# Patient Record
Sex: FEMALE | Race: WHITE | Employment: OTHER | URBAN - METROPOLITAN AREA
[De-identification: names, ages, dates, MRNs, and addresses within clinical notes are randomized per-mention and may not be internally consistent; named-entity substitution may affect disease eponyms.]

---

## 2017-03-27 ENCOUNTER — TELEPHONE (OUTPATIENT)
Dept: GASTROENTEROLOGY | Age: 72
End: 2017-03-27

## 2017-06-27 ENCOUNTER — OFFICE VISIT (OUTPATIENT)
Dept: SURGERY | Age: 72
End: 2017-06-27

## 2017-06-27 VITALS
WEIGHT: 127 LBS | HEIGHT: 57 IN | RESPIRATION RATE: 16 BRPM | DIASTOLIC BLOOD PRESSURE: 78 MMHG | HEART RATE: 80 BPM | SYSTOLIC BLOOD PRESSURE: 110 MMHG | BODY MASS INDEX: 27.4 KG/M2

## 2017-06-27 DIAGNOSIS — D05.12 DCIS (DUCTAL CARCINOMA IN SITU), LEFT: Primary | ICD-10-CM

## 2017-06-27 PROCEDURE — G8419 CALC BMI OUT NRM PARAM NOF/U: HCPCS | Performed by: SURGERY

## 2017-06-27 PROCEDURE — 1036F TOBACCO NON-USER: CPT | Performed by: SURGERY

## 2017-06-27 PROCEDURE — 1090F PRES/ABSN URINE INCON ASSESS: CPT | Performed by: SURGERY

## 2017-06-27 PROCEDURE — G8399 PT W/DXA RESULTS DOCUMENT: HCPCS | Performed by: SURGERY

## 2017-06-27 PROCEDURE — 3017F COLORECTAL CA SCREEN DOC REV: CPT | Performed by: SURGERY

## 2017-06-27 PROCEDURE — 4040F PNEUMOC VAC/ADMIN/RCVD: CPT | Performed by: SURGERY

## 2017-06-27 PROCEDURE — 3014F SCREEN MAMMO DOC REV: CPT | Performed by: SURGERY

## 2017-06-27 PROCEDURE — 1123F ACP DISCUSS/DSCN MKR DOCD: CPT | Performed by: SURGERY

## 2017-06-27 PROCEDURE — 99213 OFFICE O/P EST LOW 20 MIN: CPT | Performed by: SURGERY

## 2017-06-27 PROCEDURE — G8427 DOCREV CUR MEDS BY ELIG CLIN: HCPCS | Performed by: SURGERY

## 2017-06-27 RX ORDER — ANASTROZOLE 1 MG/1
TABLET ORAL
Qty: 30 TABLET | Refills: 11 | Status: SHIPPED | OUTPATIENT
Start: 2017-06-27 | End: 2017-10-11 | Stop reason: SDUPTHER

## 2017-07-13 ENCOUNTER — TELEPHONE (OUTPATIENT)
Dept: SURGERY | Age: 72
End: 2017-07-13

## 2017-07-13 DIAGNOSIS — Z12.31 SCREENING MAMMOGRAM, ENCOUNTER FOR: Primary | ICD-10-CM

## 2017-10-03 ENCOUNTER — HOSPITAL ENCOUNTER (OUTPATIENT)
Dept: WOMENS IMAGING | Age: 72
Discharge: HOME | End: 2017-10-03
Attending: SURGERY | Admitting: SURGERY

## 2017-10-03 DIAGNOSIS — Z12.31 SCREENING MAMMOGRAM, ENCOUNTER FOR: ICD-10-CM

## 2017-10-11 ENCOUNTER — TELEPHONE (OUTPATIENT)
Dept: SURGERY | Age: 72
End: 2017-10-11

## 2017-10-11 RX ORDER — ANASTROZOLE 1 MG/1
TABLET ORAL
Qty: 30 TABLET | Refills: 11 | Status: SHIPPED | OUTPATIENT
Start: 2017-10-11 | End: 2018-07-17 | Stop reason: SDUPTHER

## 2017-10-24 ENCOUNTER — HOSPITAL ENCOUNTER (OUTPATIENT)
Dept: CT IMAGING | Age: 72
Discharge: HOME | End: 2017-10-24
Attending: RADIOLOGY | Admitting: RADIOLOGY

## 2017-10-24 DIAGNOSIS — R59.0 LOCALIZED ENLARGED LYMPH NODES: ICD-10-CM

## 2017-10-24 DIAGNOSIS — R59.0 BILATERAL HILAR ADENOPATHY SYNDROME: ICD-10-CM

## 2017-10-24 LAB
GFR AFRICAN AMERICAN: 54 ML/MIN/1.73M2
GFR NON-AFRICAN AMERICAN: 45 ML/MIN/1.73M2
POC CREATININE: 1.2 MG/DL (ref 0.6–1.1)

## 2017-10-24 RX ORDER — SODIUM CHLORIDE 0.9 % (FLUSH) 0.9 %
10 SYRINGE (ML) INJECTION ONCE
Status: COMPLETED | OUTPATIENT
Start: 2017-10-24 | End: 2017-10-24

## 2017-10-24 RX ADMIN — Medication 10 ML: at 13:11

## 2018-01-11 ENCOUNTER — OFFICE VISIT (OUTPATIENT)
Dept: SURGERY | Age: 73
End: 2018-01-11

## 2018-01-11 VITALS
WEIGHT: 125 LBS | OXYGEN SATURATION: 96 % | SYSTOLIC BLOOD PRESSURE: 138 MMHG | DIASTOLIC BLOOD PRESSURE: 84 MMHG | HEART RATE: 84 BPM | BODY MASS INDEX: 27.05 KG/M2

## 2018-01-11 DIAGNOSIS — D05.12 DUCTAL CARCINOMA IN SITU (DCIS) OF LEFT BREAST: Primary | ICD-10-CM

## 2018-01-11 PROCEDURE — 3014F SCREEN MAMMO DOC REV: CPT | Performed by: SURGERY

## 2018-01-11 PROCEDURE — 1036F TOBACCO NON-USER: CPT | Performed by: SURGERY

## 2018-01-11 PROCEDURE — G8484 FLU IMMUNIZE NO ADMIN: HCPCS | Performed by: SURGERY

## 2018-01-11 PROCEDURE — G8419 CALC BMI OUT NRM PARAM NOF/U: HCPCS | Performed by: SURGERY

## 2018-01-11 PROCEDURE — 4040F PNEUMOC VAC/ADMIN/RCVD: CPT | Performed by: SURGERY

## 2018-01-11 PROCEDURE — G8399 PT W/DXA RESULTS DOCUMENT: HCPCS | Performed by: SURGERY

## 2018-01-11 PROCEDURE — 1090F PRES/ABSN URINE INCON ASSESS: CPT | Performed by: SURGERY

## 2018-01-11 PROCEDURE — G8427 DOCREV CUR MEDS BY ELIG CLIN: HCPCS | Performed by: SURGERY

## 2018-01-11 PROCEDURE — 1123F ACP DISCUSS/DSCN MKR DOCD: CPT | Performed by: SURGERY

## 2018-01-11 PROCEDURE — 99213 OFFICE O/P EST LOW 20 MIN: CPT | Performed by: SURGERY

## 2018-01-11 PROCEDURE — 3017F COLORECTAL CA SCREEN DOC REV: CPT | Performed by: SURGERY

## 2018-01-11 NOTE — PROGRESS NOTES
Harman Nicole is here for cancer of the left  Breast.  Initial size 2 cm. Nodes resected 0. Numbers positive: 0. Stage: TIS N0 M0. (Stage: IS)   Procedure: lumpectomy  Date: 10/13/2014   Hormonal Therapy:yes  Chemotherapy: no  Radiation Therapy: yes  BRAC no  ONCOTYPE no  ER+/[place]+/HER2+    Current Evaluation:   Today the patient is basically feeling well. She denies new breast mass, nipple discharge, or breast pain. No new subcutaneous mass, headache, bone pain, new cough, or abdominal pain. Physical Exam:   Generally healthy appearing,  female   Skin: no new subcutaneous mass   Breast: non-tender, no new mass   Lungs clear   Heart RRR  Lymphadenopathy: no new axillary and supraclavicular   Abdomen: non-tender without liver, spleen, kidney, or mass palpable     Mammography: 10/2017  Last mammogram:    Personally reviewed by me. Mammogram is unchanged from previous mammograms.  Radiologist report is negative     Impression:   No evidence of systemic or recurrent breast cancer:   No evidence of second primary breast cancer     Plan: RTC 6 mo  Will need a mammogram in Oct 2018

## 2018-07-17 ENCOUNTER — TELEPHONE (OUTPATIENT)
Dept: SURGERY | Age: 73
End: 2018-07-17

## 2018-07-17 ENCOUNTER — OFFICE VISIT (OUTPATIENT)
Dept: SURGERY | Age: 73
End: 2018-07-17

## 2018-07-17 VITALS
RESPIRATION RATE: 12 BRPM | WEIGHT: 116.6 LBS | HEART RATE: 79 BPM | DIASTOLIC BLOOD PRESSURE: 60 MMHG | SYSTOLIC BLOOD PRESSURE: 129 MMHG | BODY MASS INDEX: 25.23 KG/M2

## 2018-07-17 DIAGNOSIS — D05.12 INTRADUCTAL CARCINOMA IN SITU OF LEFT BREAST: Primary | ICD-10-CM

## 2018-07-17 DIAGNOSIS — D05.12 DUCTAL CARCINOMA IN SITU (DCIS) OF LEFT BREAST: Primary | ICD-10-CM

## 2018-07-17 PROCEDURE — 1036F TOBACCO NON-USER: CPT | Performed by: SURGERY

## 2018-07-17 PROCEDURE — G8399 PT W/DXA RESULTS DOCUMENT: HCPCS | Performed by: SURGERY

## 2018-07-17 PROCEDURE — G8419 CALC BMI OUT NRM PARAM NOF/U: HCPCS | Performed by: SURGERY

## 2018-07-17 PROCEDURE — 99213 OFFICE O/P EST LOW 20 MIN: CPT | Performed by: SURGERY

## 2018-07-17 PROCEDURE — 1090F PRES/ABSN URINE INCON ASSESS: CPT | Performed by: SURGERY

## 2018-07-17 PROCEDURE — 4040F PNEUMOC VAC/ADMIN/RCVD: CPT | Performed by: SURGERY

## 2018-07-17 PROCEDURE — 3017F COLORECTAL CA SCREEN DOC REV: CPT | Performed by: SURGERY

## 2018-07-17 PROCEDURE — 1101F PT FALLS ASSESS-DOCD LE1/YR: CPT | Performed by: SURGERY

## 2018-07-17 PROCEDURE — G8427 DOCREV CUR MEDS BY ELIG CLIN: HCPCS | Performed by: SURGERY

## 2018-07-17 PROCEDURE — 1123F ACP DISCUSS/DSCN MKR DOCD: CPT | Performed by: SURGERY

## 2018-07-17 RX ORDER — ANASTROZOLE 1 MG/1
TABLET ORAL
Qty: 30 TABLET | Refills: 11 | Status: SHIPPED | OUTPATIENT
Start: 2018-07-17 | End: 2018-11-20 | Stop reason: SDUPTHER

## 2018-07-17 NOTE — PROGRESS NOTES
Patricia Rodriguez has a pain level on 0/10 scale  0    Location breast    Description no pain    Radiation   No    Duration  6 month(s)    Time  none

## 2018-08-13 ENCOUNTER — TELEPHONE (OUTPATIENT)
Dept: SURGERY | Age: 73
End: 2018-08-13

## 2018-08-13 ENCOUNTER — HOSPITAL ENCOUNTER (OUTPATIENT)
Dept: CT IMAGING | Age: 73
Discharge: HOME | End: 2018-08-13
Attending: SURGERY | Admitting: SURGERY

## 2018-08-13 DIAGNOSIS — D05.12 BILATERAL DUCTAL CARCINOMA IN SITU OF BREASTS: ICD-10-CM

## 2018-08-13 DIAGNOSIS — D05.11 BILATERAL DUCTAL CARCINOMA IN SITU OF BREASTS: ICD-10-CM

## 2018-08-13 LAB
GFR AFRICAN AMERICAN: 47 ML/MIN/1.73M2
GFR NON-AFRICAN AMERICAN: 39 ML/MIN/1.73M2
POC CREATININE: 1.4 MG/DL (ref 0.6–1.1)

## 2018-08-20 ENCOUNTER — TELEPHONE (OUTPATIENT)
Dept: SURGERY | Age: 73
End: 2018-08-20

## 2018-09-07 ENCOUNTER — TELEPHONE (OUTPATIENT)
Dept: SURGERY | Age: 73
End: 2018-09-07

## 2018-09-07 DIAGNOSIS — Z17.0 MALIGNANT NEOPLASM OF LOWER-OUTER QUADRANT OF LEFT BREAST OF FEMALE, ESTROGEN RECEPTOR POSITIVE (HCC): ICD-10-CM

## 2018-09-07 DIAGNOSIS — C50.512 MALIGNANT NEOPLASM OF LOWER-OUTER QUADRANT OF LEFT BREAST OF FEMALE, ESTROGEN RECEPTOR POSITIVE (HCC): ICD-10-CM

## 2018-09-07 DIAGNOSIS — Z12.39 BREAST CANCER SCREENING: Primary | ICD-10-CM

## 2018-09-11 ENCOUNTER — TELEPHONE (OUTPATIENT)
Dept: SURGERY | Age: 73
End: 2018-09-11

## 2018-09-13 ENCOUNTER — TELEPHONE (OUTPATIENT)
Dept: SURGERY | Age: 73
End: 2018-09-13

## 2018-09-13 DIAGNOSIS — Z78.0 POST-MENOPAUSAL: Primary | ICD-10-CM

## 2018-10-08 ENCOUNTER — HOSPITAL ENCOUNTER (OUTPATIENT)
Dept: WOMENS IMAGING | Age: 73
Discharge: HOME | End: 2018-10-08
Payer: MEDICARE

## 2018-10-08 ENCOUNTER — HOSPITAL ENCOUNTER (OUTPATIENT)
Dept: ULTRASOUND IMAGING | Age: 73
End: 2018-10-08
Payer: MEDICARE

## 2018-10-08 DIAGNOSIS — Z17.0 MALIGNANT NEOPLASM OF LOWER-OUTER QUADRANT OF LEFT BREAST OF FEMALE, ESTROGEN RECEPTOR POSITIVE (HCC): ICD-10-CM

## 2018-10-08 DIAGNOSIS — C50.512 MALIGNANT NEOPLASM OF LOWER-OUTER QUADRANT OF LEFT BREAST OF FEMALE, ESTROGEN RECEPTOR POSITIVE (HCC): ICD-10-CM

## 2018-10-08 DIAGNOSIS — Z12.39 BREAST CANCER SCREENING: ICD-10-CM

## 2018-10-08 DIAGNOSIS — Z78.0 POST-MENOPAUSAL: ICD-10-CM

## 2018-10-08 PROCEDURE — G0279 TOMOSYNTHESIS, MAMMO: HCPCS

## 2018-10-08 PROCEDURE — 77080 DXA BONE DENSITY AXIAL: CPT

## 2018-11-20 ENCOUNTER — OFFICE VISIT (OUTPATIENT)
Dept: SURGERY | Age: 73
End: 2018-11-20
Payer: MEDICARE

## 2018-11-20 VITALS
WEIGHT: 119.4 LBS | HEART RATE: 70 BPM | BODY MASS INDEX: 25.84 KG/M2 | RESPIRATION RATE: 16 BRPM | DIASTOLIC BLOOD PRESSURE: 58 MMHG | SYSTOLIC BLOOD PRESSURE: 131 MMHG

## 2018-11-20 DIAGNOSIS — D05.12 DUCTAL CARCINOMA IN SITU (DCIS) OF LEFT BREAST: Primary | ICD-10-CM

## 2018-11-20 DIAGNOSIS — Z17.0 MALIGNANT NEOPLASM OF LOWER-OUTER QUADRANT OF LEFT BREAST OF FEMALE, ESTROGEN RECEPTOR POSITIVE (HCC): ICD-10-CM

## 2018-11-20 DIAGNOSIS — C50.512 MALIGNANT NEOPLASM OF LOWER-OUTER QUADRANT OF LEFT BREAST OF FEMALE, ESTROGEN RECEPTOR POSITIVE (HCC): ICD-10-CM

## 2018-11-20 PROCEDURE — 4040F PNEUMOC VAC/ADMIN/RCVD: CPT | Performed by: SURGERY

## 2018-11-20 PROCEDURE — 1101F PT FALLS ASSESS-DOCD LE1/YR: CPT | Performed by: SURGERY

## 2018-11-20 PROCEDURE — G8419 CALC BMI OUT NRM PARAM NOF/U: HCPCS | Performed by: SURGERY

## 2018-11-20 PROCEDURE — 99213 OFFICE O/P EST LOW 20 MIN: CPT | Performed by: SURGERY

## 2018-11-20 PROCEDURE — 1123F ACP DISCUSS/DSCN MKR DOCD: CPT | Performed by: SURGERY

## 2018-11-20 PROCEDURE — 1090F PRES/ABSN URINE INCON ASSESS: CPT | Performed by: SURGERY

## 2018-11-20 PROCEDURE — G8484 FLU IMMUNIZE NO ADMIN: HCPCS | Performed by: SURGERY

## 2018-11-20 PROCEDURE — G8427 DOCREV CUR MEDS BY ELIG CLIN: HCPCS | Performed by: SURGERY

## 2018-11-20 PROCEDURE — 1036F TOBACCO NON-USER: CPT | Performed by: SURGERY

## 2018-11-20 PROCEDURE — 3017F COLORECTAL CA SCREEN DOC REV: CPT | Performed by: SURGERY

## 2018-11-20 PROCEDURE — G8399 PT W/DXA RESULTS DOCUMENT: HCPCS | Performed by: SURGERY

## 2018-11-20 RX ORDER — ANASTROZOLE 1 MG/1
TABLET ORAL
Qty: 90 TABLET | Refills: 3 | Status: SHIPPED | OUTPATIENT
Start: 2018-11-20 | End: 2019-02-14 | Stop reason: SDUPTHER

## 2018-12-14 ENCOUNTER — TELEPHONE (OUTPATIENT)
Dept: SURGERY | Age: 73
End: 2018-12-14

## 2018-12-14 ENCOUNTER — PROCEDURE VISIT (OUTPATIENT)
Dept: SURGERY | Age: 73
End: 2018-12-14
Payer: MEDICARE

## 2018-12-14 ENCOUNTER — HOSPITAL ENCOUNTER (OUTPATIENT)
Age: 73
Discharge: HOME | End: 2018-12-14
Payer: MEDICARE

## 2018-12-14 ENCOUNTER — HOSPITAL ENCOUNTER (OUTPATIENT)
Age: 73
Setting detail: SPECIMEN
Discharge: HOME | End: 2018-12-14
Payer: MEDICARE

## 2018-12-14 ENCOUNTER — HOSPITAL ENCOUNTER (OUTPATIENT)
Dept: GENERAL RADIOLOGY | Age: 73
Discharge: HOME | End: 2018-12-14
Payer: MEDICARE

## 2018-12-14 VITALS
HEART RATE: 68 BPM | DIASTOLIC BLOOD PRESSURE: 69 MMHG | SYSTOLIC BLOOD PRESSURE: 152 MMHG | RESPIRATION RATE: 14 BRPM | BODY MASS INDEX: 25.97 KG/M2 | WEIGHT: 120 LBS

## 2018-12-14 DIAGNOSIS — L98.9 SKIN LESION: Primary | ICD-10-CM

## 2018-12-14 DIAGNOSIS — M89.9 BONE LESION: ICD-10-CM

## 2018-12-14 DIAGNOSIS — M89.9 BONE LESION: Primary | ICD-10-CM

## 2018-12-14 PROCEDURE — 88305 TISSUE EXAM BY PATHOLOGIST: CPT

## 2018-12-14 PROCEDURE — 11442 EXC FACE-MM B9+MARG 1.1-2 CM: CPT | Performed by: SURGERY

## 2018-12-14 PROCEDURE — 70355 PANORAMIC X-RAY OF JAWS: CPT

## 2018-12-14 NOTE — PROGRESS NOTES
Minor Surgery Procedure Note    MD Aida Win        [id number]       12/14/2018. Pre op DX:Non healing lesion right chin    Post OP Dx:Same    Procedure:Excision 1.5 cm lesion right chin    PROCEDURE DETAILS:Pt was brought to the room and the lesion confirmed. It was prepped and anesthetized. It was excised in an ellipse down through the subqu to the periosteum of the bone. The defect was closed with 3-0 vicryl and running 5-0 prolene. Sterile dressing was applied. Anesthesia: 3 cc's of1% xylocaine plain    Postop the patient did well and can go home. They are to remove the dressings in 24 hours and shower. Ernesto Arteaga.

## 2018-12-20 ENCOUNTER — OFFICE VISIT (OUTPATIENT)
Dept: SURGERY | Age: 73
End: 2018-12-20

## 2018-12-20 VITALS — DIASTOLIC BLOOD PRESSURE: 80 MMHG | SYSTOLIC BLOOD PRESSURE: 128 MMHG | OXYGEN SATURATION: 99 % | HEART RATE: 89 BPM

## 2018-12-20 DIAGNOSIS — Z09 POSTOP CHECK: Primary | ICD-10-CM

## 2018-12-20 PROCEDURE — 99024 POSTOP FOLLOW-UP VISIT: CPT | Performed by: NURSE PRACTITIONER

## 2018-12-27 ENCOUNTER — TELEPHONE (OUTPATIENT)
Dept: SURGERY | Age: 73
End: 2018-12-27

## 2019-02-14 RX ORDER — ANASTROZOLE 1 MG/1
TABLET ORAL
Qty: 90 TABLET | Refills: 3 | Status: SHIPPED | OUTPATIENT
Start: 2019-02-14 | End: 2020-02-17 | Stop reason: SDUPTHER

## 2019-05-21 ENCOUNTER — OFFICE VISIT (OUTPATIENT)
Dept: SURGERY | Age: 74
End: 2019-05-21
Payer: MEDICARE

## 2019-05-21 VITALS
DIASTOLIC BLOOD PRESSURE: 68 MMHG | BODY MASS INDEX: 26.1 KG/M2 | RESPIRATION RATE: 20 BRPM | WEIGHT: 120.6 LBS | SYSTOLIC BLOOD PRESSURE: 140 MMHG | HEART RATE: 74 BPM

## 2019-05-21 DIAGNOSIS — D05.12 DUCTAL CARCINOMA IN SITU (DCIS) OF LEFT BREAST: Primary | ICD-10-CM

## 2019-05-21 PROCEDURE — 99213 OFFICE O/P EST LOW 20 MIN: CPT | Performed by: SURGERY

## 2019-05-21 NOTE — PROGRESS NOTES
Joann Vidal has a pain level on 0/10 scale  0    Location left breast    Description no pain    Radiation   No    Duration  6 month(s)    Time  none

## 2019-05-21 NOTE — PROGRESS NOTES
Taylor Arm is here for cancer of the left  Breast.  Initial size 2 cm. Nodes resected 0. Numbers positive: 0. Stage: TIS N0 M0. (Stage: IS)   Procedure: lumpectomy  Date: 10/13/2014   Hormonal Therapy:yes, still taking  Chemotherapy: no  Radiation Therapy: done  [facility] no  ONCOTYPE no  ER+/[place]+/HER2+    Current Evaluation:   Today the patient is basically feeling well. She denies new breast mass, nipple discharge, or breast pain. No new subcutaneous mass, headache, bone pain, new cough, or abdominal pain. Physical Exam:   Generally healthy appearing,  female   Skin: no new subcutaneous mass   Breast: non-tender, no new mass   Lungs clear   Heart RRR  Lymphadenopathy: no new axillary and supraclavicular   Abdomen: non-tender without liver, spleen, kidney, or mass palpable     Mammography:   Last mammogram: 10/2018   Personally reviewed by me. Mammogram is unchanged from previous mammograms.  Radiologist report is negative     Impression:   No evidence of systemic or recurrent breast cancer:   No evidence of second primary breast cancer     Plan: RTC 6 mo  Will need a mammogram in Oct 2019

## 2019-10-09 ENCOUNTER — HOSPITAL ENCOUNTER (OUTPATIENT)
Dept: WOMENS IMAGING | Age: 74
Discharge: HOME | End: 2019-10-09
Payer: MEDICARE

## 2019-10-09 DIAGNOSIS — Z12.31 VISIT FOR SCREENING MAMMOGRAM: ICD-10-CM

## 2019-10-09 PROCEDURE — 77063 BREAST TOMOSYNTHESIS BI: CPT

## 2019-10-25 ENCOUNTER — OFFICE VISIT (OUTPATIENT)
Dept: FAMILY MEDICINE CLINIC | Age: 74
End: 2019-10-25
Payer: MEDICARE

## 2019-10-25 VITALS
SYSTOLIC BLOOD PRESSURE: 140 MMHG | BODY MASS INDEX: 23.26 KG/M2 | TEMPERATURE: 97 F | DIASTOLIC BLOOD PRESSURE: 74 MMHG | HEIGHT: 59 IN | OXYGEN SATURATION: 95 % | WEIGHT: 115.4 LBS | HEART RATE: 74 BPM

## 2019-10-25 DIAGNOSIS — Z76.89 ESTABLISHING CARE WITH NEW DOCTOR, ENCOUNTER FOR: ICD-10-CM

## 2019-10-25 DIAGNOSIS — I10 ESSENTIAL HYPERTENSION: ICD-10-CM

## 2019-10-25 DIAGNOSIS — Z85.850 HISTORY OF THYROID CANCER: ICD-10-CM

## 2019-10-25 DIAGNOSIS — Z91.81 AT HIGH RISK FOR FALLS: ICD-10-CM

## 2019-10-25 DIAGNOSIS — F32.5 MAJOR DEPRESSIVE DISORDER WITH SINGLE EPISODE, IN FULL REMISSION (HCC): ICD-10-CM

## 2019-10-25 DIAGNOSIS — E03.9 ACQUIRED HYPOTHYROIDISM: ICD-10-CM

## 2019-10-25 DIAGNOSIS — Z85.3 HISTORY OF BREAST CANCER: ICD-10-CM

## 2019-10-25 DIAGNOSIS — K51.90 ULCERATIVE COLITIS WITHOUT COMPLICATIONS, UNSPECIFIED LOCATION (HCC): ICD-10-CM

## 2019-10-25 DIAGNOSIS — M81.0 OSTEOPOROSIS WITHOUT CURRENT PATHOLOGICAL FRACTURE, UNSPECIFIED OSTEOPOROSIS TYPE: ICD-10-CM

## 2019-10-25 DIAGNOSIS — J69.0 ASPIRATION PNEUMONIA, UNSPECIFIED ASPIRATION PNEUMONIA TYPE, UNSPECIFIED LATERALITY, UNSPECIFIED PART OF LUNG (HCC): Primary | ICD-10-CM

## 2019-10-25 PROCEDURE — 99204 OFFICE O/P NEW MOD 45 MIN: CPT | Performed by: FAMILY MEDICINE

## 2019-10-25 RX ORDER — METOPROLOL SUCCINATE 25 MG/1
25 TABLET, EXTENDED RELEASE ORAL DAILY
Qty: 90 TABLET | Refills: 0 | Status: SHIPPED | OUTPATIENT
Start: 2019-10-25 | End: 2019-12-10

## 2019-10-25 RX ORDER — ALENDRONATE SODIUM 70 MG/1
1 TABLET ORAL
COMMUNITY
End: 2019-10-25 | Stop reason: SDUPTHER

## 2019-10-25 RX ORDER — POTASSIUM CHLORIDE 20 MEQ/1
20 TABLET, EXTENDED RELEASE ORAL DAILY
Qty: 90 TABLET | Refills: 0 | Status: SHIPPED | OUTPATIENT
Start: 2019-10-25 | End: 2020-01-06 | Stop reason: SDUPTHER

## 2019-10-25 RX ORDER — ALENDRONATE SODIUM 70 MG/1
70 TABLET ORAL
Qty: 12 TABLET | Refills: 3 | Status: SHIPPED | OUTPATIENT
Start: 2019-10-25 | End: 2020-01-06 | Stop reason: SDUPTHER

## 2019-10-25 RX ORDER — AMLODIPINE BESYLATE 10 MG/1
10 TABLET ORAL DAILY
Qty: 90 TABLET | Refills: 0 | Status: SHIPPED | OUTPATIENT
Start: 2019-10-25 | End: 2020-01-06 | Stop reason: SDUPTHER

## 2019-10-25 RX ORDER — VENLAFAXINE HYDROCHLORIDE 37.5 MG/1
37.5 TABLET, EXTENDED RELEASE ORAL
Qty: 90 TABLET | Refills: 0 | Status: SHIPPED | OUTPATIENT
Start: 2019-10-25 | End: 2020-01-06 | Stop reason: CLARIF

## 2019-10-25 RX ORDER — SPIRONOLACTONE 25 MG/1
25 TABLET ORAL DAILY
Qty: 90 TABLET | Refills: 0 | Status: SHIPPED | OUTPATIENT
Start: 2019-10-25 | End: 2020-01-06 | Stop reason: SDUPTHER

## 2019-10-25 RX ORDER — ALBUTEROL SULFATE 90 UG/1
2 AEROSOL, METERED RESPIRATORY (INHALATION)
COMMUNITY
Start: 2019-09-30 | End: 2022-02-21

## 2019-10-25 RX ORDER — POTASSIUM CHLORIDE 20 MEQ/1
TABLET, EXTENDED RELEASE ORAL
COMMUNITY
End: 2019-10-25 | Stop reason: SDUPTHER

## 2019-10-25 ASSESSMENT — PATIENT HEALTH QUESTIONNAIRE - PHQ9
1. LITTLE INTEREST OR PLEASURE IN DOING THINGS: 1
2. FEELING DOWN, DEPRESSED OR HOPELESS: 1
SUM OF ALL RESPONSES TO PHQ QUESTIONS 1-9: 2
SUM OF ALL RESPONSES TO PHQ9 QUESTIONS 1 & 2: 2
SUM OF ALL RESPONSES TO PHQ QUESTIONS 1-9: 2

## 2019-10-25 ASSESSMENT — ENCOUNTER SYMPTOMS
SHORTNESS OF BREATH: 1
NAUSEA: 0
VOICE CHANGE: 0
DIARRHEA: 0
VOMITING: 0
BLOOD IN STOOL: 1
COUGH: 1
ABDOMINAL PAIN: 0

## 2019-11-19 ENCOUNTER — TELEPHONE (OUTPATIENT)
Dept: FAMILY MEDICINE CLINIC | Age: 74
End: 2019-11-19

## 2019-11-26 ENCOUNTER — OFFICE VISIT (OUTPATIENT)
Dept: SURGERY | Age: 74
End: 2019-11-26
Payer: MEDICARE

## 2019-11-26 VITALS
BODY MASS INDEX: 22.62 KG/M2 | DIASTOLIC BLOOD PRESSURE: 79 MMHG | SYSTOLIC BLOOD PRESSURE: 144 MMHG | WEIGHT: 112 LBS | RESPIRATION RATE: 18 BRPM | HEART RATE: 90 BPM

## 2019-11-26 DIAGNOSIS — D05.12 DUCTAL CARCINOMA IN SITU (DCIS) OF LEFT BREAST: Primary | ICD-10-CM

## 2019-11-26 PROCEDURE — 99213 OFFICE O/P EST LOW 20 MIN: CPT | Performed by: SURGERY

## 2019-12-03 ENCOUNTER — HOSPITAL ENCOUNTER (OUTPATIENT)
Dept: RADIATION ONCOLOGY | Age: 74
Discharge: HOME | End: 2019-12-03
Payer: MEDICARE

## 2019-12-03 VITALS
HEART RATE: 91 BPM | WEIGHT: 112 LBS | DIASTOLIC BLOOD PRESSURE: 77 MMHG | HEIGHT: 60 IN | OXYGEN SATURATION: 98 % | TEMPERATURE: 98.1 F | SYSTOLIC BLOOD PRESSURE: 147 MMHG | RESPIRATION RATE: 16 BRPM | BODY MASS INDEX: 21.99 KG/M2

## 2019-12-03 ASSESSMENT — PAIN SCALES - GENERAL: PAINLEVEL_OUTOF10: 0

## 2019-12-06 RX ORDER — METOPROLOL SUCCINATE 25 MG/1
25 TABLET, EXTENDED RELEASE ORAL DAILY
Qty: 90 TABLET | Refills: 0 | OUTPATIENT
Start: 2019-12-06

## 2019-12-10 RX ORDER — METOPROLOL SUCCINATE 100 MG/1
100 TABLET, EXTENDED RELEASE ORAL DAILY
Qty: 30 TABLET | Refills: 0
Start: 2019-12-10 | End: 2019-12-30 | Stop reason: SDUPTHER

## 2019-12-26 RX ORDER — METOPROLOL SUCCINATE 100 MG/1
100 TABLET, EXTENDED RELEASE ORAL DAILY
Qty: 30 TABLET | Refills: 0 | OUTPATIENT
Start: 2019-12-26

## 2019-12-30 RX ORDER — METOPROLOL SUCCINATE 100 MG/1
100 TABLET, EXTENDED RELEASE ORAL DAILY
Qty: 30 TABLET | Refills: 0 | Status: SHIPPED | OUTPATIENT
Start: 2019-12-30 | End: 2020-01-06 | Stop reason: SDUPTHER

## 2020-01-06 ENCOUNTER — OFFICE VISIT (OUTPATIENT)
Dept: FAMILY MEDICINE CLINIC | Age: 75
End: 2020-01-06
Payer: MEDICARE

## 2020-01-06 VITALS
DIASTOLIC BLOOD PRESSURE: 80 MMHG | WEIGHT: 112.4 LBS | SYSTOLIC BLOOD PRESSURE: 134 MMHG | TEMPERATURE: 97.1 F | HEART RATE: 81 BPM | BODY MASS INDEX: 21.95 KG/M2 | OXYGEN SATURATION: 98 %

## 2020-01-06 PROBLEM — E89.0 POSTOPERATIVE HYPOTHYROIDISM: Status: ACTIVE | Noted: 2019-10-25

## 2020-01-06 PROCEDURE — 99214 OFFICE O/P EST MOD 30 MIN: CPT | Performed by: FAMILY MEDICINE

## 2020-01-06 RX ORDER — POTASSIUM CHLORIDE 20 MEQ/1
20 TABLET, EXTENDED RELEASE ORAL DAILY
Qty: 90 TABLET | Refills: 1 | Status: SHIPPED | OUTPATIENT
Start: 2020-01-06 | End: 2020-08-24 | Stop reason: SDUPTHER

## 2020-01-06 RX ORDER — VENLAFAXINE HYDROCHLORIDE 37.5 MG/1
37.5 CAPSULE, EXTENDED RELEASE ORAL DAILY
Qty: 90 CAPSULE | Refills: 1 | Status: SHIPPED | OUTPATIENT
Start: 2020-01-06 | End: 2020-08-24 | Stop reason: SDUPTHER

## 2020-01-06 RX ORDER — METOPROLOL SUCCINATE 100 MG/1
100 TABLET, EXTENDED RELEASE ORAL DAILY
Qty: 90 TABLET | Refills: 1 | Status: SHIPPED | OUTPATIENT
Start: 2020-01-06 | End: 2020-08-24 | Stop reason: SDUPTHER

## 2020-01-06 RX ORDER — ALENDRONATE SODIUM 70 MG/1
70 TABLET ORAL
Qty: 12 TABLET | Refills: 3 | Status: SHIPPED | OUTPATIENT
Start: 2020-01-06 | End: 2021-04-01 | Stop reason: SDUPTHER

## 2020-01-06 RX ORDER — VENLAFAXINE HYDROCHLORIDE 37.5 MG/1
37.5 CAPSULE, EXTENDED RELEASE ORAL DAILY
COMMUNITY
End: 2020-01-06 | Stop reason: SDUPTHER

## 2020-01-06 RX ORDER — LEVOTHYROXINE SODIUM 88 UG/1
88 CAPSULE ORAL DAILY
Qty: 30 CAPSULE | Status: CANCELLED | OUTPATIENT
Start: 2020-01-06

## 2020-01-06 RX ORDER — ALENDRONATE SODIUM 70 MG/1
70 TABLET ORAL
Qty: 12 TABLET | Refills: 3 | Status: CANCELLED | OUTPATIENT
Start: 2020-01-06

## 2020-01-06 RX ORDER — SPIRONOLACTONE 25 MG/1
25 TABLET ORAL DAILY
Qty: 90 TABLET | Refills: 1 | Status: SHIPPED | OUTPATIENT
Start: 2020-01-06 | End: 2020-08-24 | Stop reason: SDUPTHER

## 2020-01-06 RX ORDER — ANASTROZOLE 1 MG/1
TABLET ORAL
Qty: 90 TABLET | Refills: 3 | Status: CANCELLED | OUTPATIENT
Start: 2020-01-06

## 2020-01-06 RX ORDER — AMLODIPINE BESYLATE 10 MG/1
10 TABLET ORAL DAILY
Qty: 90 TABLET | Refills: 1 | Status: SHIPPED | OUTPATIENT
Start: 2020-01-06 | End: 2020-08-24 | Stop reason: SDUPTHER

## 2020-01-06 ASSESSMENT — ENCOUNTER SYMPTOMS: SHORTNESS OF BREATH: 0

## 2020-01-06 NOTE — PROGRESS NOTES
of children: Not on file    Years of education: Not on file    Highest education level: Not on file   Occupational History    Not on file   Social Needs    Financial resource strain: Not on file    Food insecurity:     Worry: Not on file     Inability: Not on file    Transportation needs:     Medical: Not on file     Non-medical: Not on file   Tobacco Use    Smoking status: Never Smoker    Smokeless tobacco: Never Used   Substance and Sexual Activity    Alcohol use: Yes     Comment: average 3-4 times per year    Drug use: No    Sexual activity: Not on file   Lifestyle    Physical activity:     Days per week: Not on file     Minutes per session: Not on file    Stress: Not on file   Relationships    Social connections:     Talks on phone: Not on file     Gets together: Not on file     Attends Congregation service: Not on file     Active member of club or organization: Not on file     Attends meetings of clubs or organizations: Not on file     Relationship status: Not on file    Intimate partner violence:     Fear of current or ex partner: Not on file     Emotionally abused: Not on file     Physically abused: Not on file     Forced sexual activity: Not on file   Other Topics Concern    Not on file   Social History Narrative    Not on file     Family History   Problem Relation Age of Onset    High Blood Pressure Mother     Cancer Father         liver cancer          Objective:   Physical Exam  Vitals signs and nursing note reviewed. Constitutional:       General: She is not in acute distress. Appearance: Normal appearance. She is not ill-appearing. HENT:      Head: Normocephalic and atraumatic. Right Ear: External ear normal.      Left Ear: External ear normal.      Mouth/Throat:      Mouth: Mucous membranes are moist.   Neck:      Musculoskeletal: Neck supple. No muscular tenderness. Cardiovascular:      Rate and Rhythm: Normal rate and regular rhythm. Heart sounds: No murmur. tablet, Take 1 tablet by mouth daily, Disp: 90 tablet, Rfl: 1    spironolactone (ALDACTONE) 25 MG tablet, Take 1 tablet by mouth daily, Disp: 90 tablet, Rfl: 1    venlafaxine (EFFEXOR XR) 37.5 MG extended release capsule, Take 1 capsule by mouth daily, Disp: 90 capsule, Rfl: 1    alendronate (FOSAMAX) 70 MG tablet, Take 1 tablet by mouth every 7 days, Disp: 12 tablet, Rfl: 3    anastrozole (ARIMIDEX) 1 MG tablet, TAKE ONE TABLET BY MOUTH ONCE DAILY, Disp: 90 tablet, Rfl: 3    Levothyroxine Sodium 88 MCG CAPS, Take 88 mcg by mouth Daily , Disp: , Rfl:     calcium-vitamin D (OSCAL-500) 500-200 MG-UNIT per tablet, Take 1 tablet by mouth daily. , Disp: , Rfl:     albuterol sulfate HFA (PROVENTIL HFA) 108 (90 Base) MCG/ACT inhaler, Inhale 2 puffs into the lungs, Disp: , Rfl:          Jm Urbina MD

## 2020-02-17 RX ORDER — ANASTROZOLE 1 MG/1
TABLET ORAL
Qty: 90 TABLET | Refills: 3 | Status: SHIPPED | OUTPATIENT
Start: 2020-02-17 | End: 2021-04-13 | Stop reason: SDUPTHER

## 2020-03-09 ENCOUNTER — TELEPHONE (OUTPATIENT)
Dept: FAMILY MEDICINE CLINIC | Age: 75
End: 2020-03-09

## 2020-03-23 ENCOUNTER — TELEPHONE (OUTPATIENT)
Dept: FAMILY MEDICINE CLINIC | Age: 75
End: 2020-03-23

## 2020-03-23 NOTE — TELEPHONE ENCOUNTER
I recommend fluids, rest, Motrin/tylenol for fever/myalgia, OTC cough medication. I can send in a prescription cough medication if needed.

## 2020-03-25 ENCOUNTER — TELEPHONE (OUTPATIENT)
Dept: FAMILY MEDICINE CLINIC | Age: 75
End: 2020-03-25

## 2020-03-27 NOTE — TELEPHONE ENCOUNTER
Patient daughter called back state mother is getting worse and asked for a squad. Advised her to call the paramedics and explain her symptoms and possible concern for COVID over the phone so they are aware.  Daughter agreed and expressed thanks

## 2020-03-27 NOTE — TELEPHONE ENCOUNTER
Patient daughter calling back with concerns for COVID-19. State she is unsure if she has a fever but seems to have taken a turn for the worse. She cries all the time and is very emotional. Still having productive cough, chest congestion, SOB, fatigue and weakness. State she is not eating at all and daughter is very scared for patient, feels lost and unsure what to do.  Discussed having her admitted to the hospital but she is concerned about being to close to her and would like your recommendation on what to do

## 2020-04-16 ENCOUNTER — TELEPHONE (OUTPATIENT)
Dept: FAMILY MEDICINE CLINIC | Age: 75
End: 2020-04-16

## 2020-04-17 NOTE — TELEPHONE ENCOUNTER
Is she drinking honey thickened liquids? What inhalers/meds is she taking for her breathing? Has she talked/seen her pulmonologist recently?

## 2020-04-25 LAB
BUN BLDV-MCNC: 26 MG/DL
CALCIUM SERPL-MCNC: 10.9 MG/DL
CHLORIDE BLD-SCNC: 98 MMOL/L
CO2: 27 MMOL/L
CREAT SERPL-MCNC: 1.7 MG/DL
GFR CALCULATED: 29
GLUCOSE BLD-MCNC: 117 MG/DL
POTASSIUM SERPL-SCNC: 4.7 MMOL/L
SODIUM BLD-SCNC: 141 MMOL/L
T4 FREE: 1.42
TSH SERPL DL<=0.05 MIU/L-ACNC: 2.9 UIU/ML

## 2020-05-21 ENCOUNTER — TELEPHONE (OUTPATIENT)
Dept: FAMILY MEDICINE CLINIC | Age: 75
End: 2020-05-21

## 2020-05-21 NOTE — TELEPHONE ENCOUNTER
Patient called state we should receive a form from [employer] for her ostomy supplies. Wants to inform you state you have never filled form out before but this is the company she uses for her supplies. Patient given fax number for company to send over form.

## 2020-07-15 ENCOUNTER — TELEPHONE (OUTPATIENT)
Dept: FAMILY MEDICINE CLINIC | Age: 75
End: 2020-07-15

## 2020-07-15 NOTE — TELEPHONE ENCOUNTER
Patient is on puree diet and thick liquids. Patient ROM and Oral has improved and patient is wanting to go on thinner liquids. They need the test to determine if patient is ready to move to the thinner liquids the tech that will be doing the test will be working with patient and showing some techniques to help patient.

## 2020-07-15 NOTE — TELEPHONE ENCOUNTER
Radha Speech therapists  from [facility] called wanting to get a swallow study done at [facility].  The test would be a Swallow study with speech therapy present.           Radha can be reached at [phone]

## 2020-07-16 NOTE — TELEPHONE ENCOUNTER
[facility] calling back the order needs to be for video fluoroscopy.  Order can be faxed to [phone] [facility]

## 2020-08-24 ENCOUNTER — OFFICE VISIT (OUTPATIENT)
Dept: FAMILY MEDICINE CLINIC | Age: 75
End: 2020-08-24
Payer: MEDICARE

## 2020-08-24 VITALS
BODY MASS INDEX: 21.95 KG/M2 | OXYGEN SATURATION: 96 % | DIASTOLIC BLOOD PRESSURE: 80 MMHG | HEART RATE: 103 BPM | WEIGHT: 112.4 LBS | TEMPERATURE: 98.4 F | SYSTOLIC BLOOD PRESSURE: 144 MMHG

## 2020-08-24 PROBLEM — D25.9 UTERINE LEIOMYOMA: Status: ACTIVE | Noted: 2020-08-24

## 2020-08-24 PROCEDURE — 99215 OFFICE O/P EST HI 40 MIN: CPT | Performed by: FAMILY MEDICINE

## 2020-08-24 RX ORDER — SPIRONOLACTONE 25 MG/1
25 TABLET ORAL DAILY
Qty: 90 TABLET | Refills: 1 | Status: SHIPPED | OUTPATIENT
Start: 2020-08-24 | End: 2021-02-25 | Stop reason: SDUPTHER

## 2020-08-24 RX ORDER — ANASTROZOLE 1 MG/1
TABLET ORAL
Qty: 90 TABLET | Refills: 3 | Status: CANCELLED | OUTPATIENT
Start: 2020-08-24

## 2020-08-24 RX ORDER — POTASSIUM CHLORIDE 20 MEQ/1
20 TABLET, EXTENDED RELEASE ORAL DAILY
Qty: 90 TABLET | Refills: 1 | Status: SHIPPED | OUTPATIENT
Start: 2020-08-24 | End: 2021-05-11 | Stop reason: SDUPTHER

## 2020-08-24 RX ORDER — METOPROLOL SUCCINATE 100 MG/1
100 TABLET, EXTENDED RELEASE ORAL DAILY
Qty: 90 TABLET | Refills: 1 | Status: SHIPPED | OUTPATIENT
Start: 2020-08-24 | End: 2021-02-25 | Stop reason: SDUPTHER

## 2020-08-24 RX ORDER — AMLODIPINE BESYLATE 10 MG/1
10 TABLET ORAL DAILY
Qty: 90 TABLET | Refills: 1 | Status: SHIPPED | OUTPATIENT
Start: 2020-08-24 | End: 2021-02-25 | Stop reason: SDUPTHER

## 2020-08-24 RX ORDER — VENLAFAXINE HYDROCHLORIDE 37.5 MG/1
37.5 CAPSULE, EXTENDED RELEASE ORAL DAILY
Qty: 90 CAPSULE | Refills: 1 | Status: SHIPPED | OUTPATIENT
Start: 2020-08-24 | End: 2021-02-25 | Stop reason: SDUPTHER

## 2020-08-24 ASSESSMENT — ENCOUNTER SYMPTOMS: SHORTNESS OF BREATH: 0

## 2020-08-24 NOTE — PROGRESS NOTES
Subjective:      Patient ID: Adelaide Wylie is a 70 y.o. female. Karo Lozano is here to follow up on her chronic conditions. Hypertension   This is a chronic problem. The problem is controlled. Pertinent negatives include no chest pain, peripheral edema or shortness of breath. There are no known risk factors for coronary artery disease. Past treatments include beta blockers, calcium channel blockers and diuretics. The current treatment provides significant improvement. There are no compliance problems. There is no history of CAD/MI. Hypothyroid  Has been on current dose of  levothyroxine (Synthroid) for a couple years. Pt reports symptoms have been stable. Reports residual symptoms to include none. Pt denies heat / cold intolerance. History of high blood pressure? yes  History of diabetes? no    Lab Results   Component Value Date    TSH 2.900 04/24/2020     Lab Results   Component Value Date    TSH 2.900 04/24/2020       No components found for: CHLPL  No results found for: TRIG  No results found for: HDL  No results found for: LDLCALC  No results found for: LABVLDL  Dysphagia  Newly diagnosed. She is thickening her foods. She is crushing her meds to toake in applesauce. She has a question as to if she can crush fosamax. No further pneumonia since changing diet. Osteoporosis  Chronic. Meds: fosamax  Compliance takes weekly. Side effects: none. Last DEXA: 10/2018 - osteoporosis. T range -1.8 to -2.5  D&A  Chronic. Meds: Effexor  Compliance: takes daily. Side effects: none  Efficacy: works great. Doing well/   Cancer  Concerns about bladder cancer. Has received info from insurance about claims for bladder cancer. She has known thyroid and breast cancer, as well as a BCC of the skin. She reports seeing a urologist this past March in regards to a bladder tumor. She had an ultrasound but has not heard anything else. CT Scan 2/2020  Result Impression   1.  Mass in the pelvis of unclear origin. Right lower quadrant ostomy. Streak artifact secondary to right hip arthroplasty limits evaluation. There is no evidence of small or large bowel obstruction  2.  Large hiatal hernia  3.  Nodules in the lung bases worrisome for metastases  4.  Bilateral renal cortical cysts  5.  Small hepatic cyst           This dictation was created with voice recognition software.  While attempts have been made to review the dictation as it is transcribed, on occasion the spoken word can be misinterpreted by the technology leading to omissions or inappropriate words, phrases or sentences. Electronically Signed by: Petty Zayas, 2/20/2020 1:21 PM   Result Narrative   PROCEDURE: CT-ABD/PELVIS W IV AND ORAL CONTRAST     DATE OF EXAM:  2/20/2020 12:47 PM    DEMOGRAPHICS: 70years old Female     INDICATION:     Bowel obstruction high-grade suspected          Contrast utilized and the relevant clinical information: History:  abd pain. Number of Series/Images:  4. 75 ISOVUE 370 76 % injection. COMPARISON: 10/15/2019    TECHNIQUE: Contiguous axial slices of the abdomen and pelvis were submitted after the IV administration of contrast. No oral contrast was utilized. Additional coronal reformatted images were submitted.      DOSE OPTIMIZATION: CT radiation dose optimization techniques (automated exposure control, and use of iterative reconstruction techniques, or adjustment of the mA and/or kV according to patient size) were used to limit patient radiation dose. FINDINGS:  CT ABDOMEN:    Inferior chest:  Dependent changes in the lower lobes. Multiple nodules in both lung bases again seen. Dependent changes are seen in the right lower lobe. Heart size is normal. Trace pericardial effusion. No pleural effusion. Gallbladder:  Mildly distended. Dilated common duct measuring 1 cm.     Liver:  Normal enhancement with a fluid attenuation mass in the right lobe measuring 1.2 cm    Spleen: Normal enhancement    Pancreas: Normal findings and post therapeutic changes as described above.               Electronically Signed by: Ameena Ojeda, 2/28/2020 12:06 PM   Result Narrative   PET-CT TUMOR IMAGING SKULL BASE TO THIGH, PET-BRAIN POST WB (COUNT/NR)    CLINICAL INDICATION: The patient is a 62-year-old female with multiple malignancies. She has had bilateral breast cancer initially diagnosed on the left in 2002, for which she received lumpectomy at the time as well as hormonal blockage. In 2014, she received contralateral right lumpectomy and adjuvant radiation, as well as well as hormone blockage of focal or overall overall all. She has an additional history of thyroid carcinoma treated with resection and radioiodine treatment in 2005, and of basal cell carcinoma, which was treated surgically. She now presents for a 7 cm pelvic mass posterior to the bladder and chronic bilateral lung nodules. Surveillance PET/CT is requested. No prior PET/CT is available for comparison. TECHNIQUE: The patient was studied in a fasting state and 8.7 mCi of fluorine 18 labeled deoxyglucose was given intravenously. About 60 minutes later tomographic imaging of the brain, neck, chest, abdomen and pelvis was obtained. The patient's blood sugar was 145 mg/dl at the time of injection. A low dose CT scan was acquired in order to provide attenuation correction and basic spatial localization for the PET images. The CT scan portion of the study is not for diagnostic purposes. No contrast agent was given and no breath hold technique was performed. A separate referral should be made if a diagnostic CT is required. FINDINGS: Physiologic radiotracer uptake is seen throughout the brain. Within the right thyroid bed, there is focally elevated radiotracer uptake within an ill-defined 2.4 x 2.0 cm soft tissue density mass with SUV Max of 4.7, which is highly concerning for locally recurrent thyroid cancer.  Correlation with thyroglobulin values and/or Thyrogen enhanced whole body imaging could be considered for further evaluation. Otherwise physiologic radiotracer uptake is seen throughout the head and neck structures. There is evidence of prior right lumpectomy with metallic clips in the inferior and upper outer portion of the breast. Within the posterior aspect of the right middle lobe, 2 adjacent pulmonary nodules show elevated radiotracer uptake, measuring 0.5 cm with SUV Max of 1.7, and 0.9 cm with SUV Max of 1.2. In the medial aspect of the right middle lobe, a 0.6 cm pulmonary nodule shows elevated radiotracer uptake with SUV Max of 2.5. In the medial base of the right middle lobe, a 0.6 cm pulmonary opacity shows SUV Max of 1.0. A 0.3 cm pulmonary opacity in the major fissure of the right lung shows SUV Max of 0.8. In the central left lower lobe, a 0.4 cm pulmonary opacity shows SUV Max of 0.9. In the posteromedial left lower lobe, a subpleural 0.5 cm pulmonary opacity shows SUV Max of 0.7. While not all of the nodules meet SUV values that are definitely suggestive for malignancy, the overall pattern is compatible with metastatic disease. Metallic clip is apparent in the left hilum. Additional metallic clip is apparent in the left lower lobe lung base. There is otherwise physiologic radiotracer uptake throughout mediastinal, pulmonary, axillary, and breast structures. There is physiologic radiotracer uptake throughout the abdomen and pelvis. Right lower quadrant enterostomy is apparent. There is suture in the anterior left paramedian upper body wall. The patient has had right hip arthroplasty. There are degenerative changes, e.g. involving the spine. There is focally prominent radiotracer uptake within the left hemipelvis posteriorly and inferiorly adjacent to the left hip joint with SUV Max of 2.7.  This level of radiotracer uptake could be seen in physiologic bone marrow, however given slight focality of the finding, additional imaging, e.g. with MRI, could be considered as appropriate to rule out underlying bone marrow malignancy with certainty. The osseous and soft tissue structures show otherwise physiologic uptake of the radiotracer     U/S pelvis 3/20  Result Addenda   Addendum by Arvind Lora MD on 03/05/2020 12:16 PM  ** ADDENDUM: #1 **  Additional comparison is made to a CT abdomen from 2/20/2020. The   hypoechoic structure identified in the pelvis on this examination likely   corresponds to the suspected fibroid identified in the uterus on the prior   CT. Electronically Signed by: Nidia Hays MD, 3/5/2020 12:16 PM   Result Impression   1.  Suboptimal evaluation of the pelvis with transabdominal approach. There is a soft tissue structure identified posterior to the urinary bladder that may represent the uterus but normal uterine anatomy is not well demonstrated on this exam.           This dictation was created with voice recognition software.  While attempts have been made to review the dictation as it is transcribed, on occasion the spoken word can be misinterpreted by the technology leading to omissions or inappropriate words, phrases or sentences. Electronically Signed by: Nidia Hays MD, 3/2/2020 3:31 PM   Result Narrative     ** ORIGINAL REPORT **  EXAMINATION: US-PELVIS TRANSABDOMINAL 36081     DATE OF EXAM:  3/2/2020 11:54 AM    DEMOGRAPHICS: 70years old Female     INDICATION:     R19.00: Intra-abdominal and pelvic swelling, mass and lump, unspecified site     Reason for Exam:  Intra-abdominal and pelvic swelling, mass and lump, unspecified site. COMPARISON: No existing relevant imaging study corresponding to the same anatomical region is available. TECHNIQUE: Transabdominal sonographic evaluation of the pelvis was performed. FINDINGS:  UTERUS: Evaluation is limited by transabdominal approach. There is a soft tissue echogenicity structure identified in the area posterior to the urinary bladder. This measures 62 x 59 x 59 mm.  This may represent a uterus but the endometrium and normal uterine contour is not well demonstrated. RIGHT OVARY: Not visualized    LEFT OVARY: Not visualized    OTHER: There is no free fluid in the pelvis. Review of Systems   Respiratory: Negative for shortness of breath. Cardiovascular: Negative for chest pain. 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 Jeannett Primrose [facility]    Hypertension     Ileostomy care [facility])     Nausea & vomiting     \"got sick with one surgery\"    Thyroid ca [facility])     had thyroid cancer in 2005- tx with radioactive iodine and radiation    Thyroid disease     hypo    Ulcerative colitis ([facility]      Past Surgical History:   Procedure Laterality Date    APPENDECTOMY      BREAST LUMPECTOMY Left 10/13/14    left lumpectomy with needle localization    BREAST SURGERY      2001 lumpectomy right breast    ILEOSTOMY OR JEJUNOSTOMY Right 1966    colon surgery\"removed most of the large and part of small intestine\"'removed appendix at that time    JOINT REPLACEMENT Right     hip   [address]    \"removed a tumor from the left lung\"    RHINOPLASTY      TONSILLECTOMY  as a child     Social History     Socioeconomic History    Marital status:      Spouse name: Not on file    Number of children: Not on file    Years of education: Not on file    Highest education level: Not on file   Occupational History    Not on file   Social Needs    Financial resource strain: Not on file    Food insecurity     Worry: Not on file     Inability: Not on file    Transportation needs     Medical: Not on file     Non-medical: Not on file   Tobacco Use    Smoking status: Never Smoker    Smokeless tobacco: Never Used   Substance and Sexual Activity    Alcohol use:  Yes Comment: average 3-4 times per year    Drug use: No    Sexual activity: Not on file   Lifestyle    Physical activity     Days per week: Not on file     Minutes per session: Not on file    Stress: Not on file   Relationships    Social connections     Talks on phone: Not on file     Gets together: Not on file     Attends Confucianist service: Not on file     Active member of club or organization: Not on file     Attends meetings of clubs or organizations: Not on file     Relationship status: Not on file    Intimate partner violence     Fear of current or ex partner: Not on file     Emotionally abused: Not on file     Physically abused: Not on file     Forced sexual activity: Not on file   Other Topics Concern    Not on file   Social History Narrative    Not on file     Family History   Problem Relation Age of Onset    High Blood Pressure Mother     Cancer Father         liver cancer        Objective:   Physical Exam  Vitals signs and nursing note reviewed. Constitutional:       Appearance: Normal appearance. She is not ill-appearing. Neck:      Musculoskeletal: Neck supple. No muscular tenderness. Cardiovascular:      Rate and Rhythm: Normal rate and regular rhythm. Heart sounds: No murmur. Pulmonary:      Effort: Pulmonary effort is normal.      Breath sounds: Normal breath sounds. No wheezing, rhonchi or rales. Musculoskeletal:      Right lower leg: No edema. Left lower leg: No edema. Lymphadenopathy:      Cervical: No cervical adenopathy. Neurological:      General: No focal deficit present. Mental Status: She is alert and oriented to person, place, and time. Psychiatric:         Mood and Affect: Mood normal.         Behavior: Behavior normal.         Assessment:       Diagnosis Orders   1. Essential hypertension  spironolactone (ALDACTONE) 25 MG tablet    metoprolol succinate (TOPROL XL) 100 MG extended release tablet    amLODIPine (NORVASC) 10 MG tablet   2.  Dysphagia, pharyngeal phase     3. Hypokalemia  spironolactone (ALDACTONE) 25 MG tablet    potassium chloride (KLOR-CON M) 20 MEQ extended release tablet   4. Postoperative hypothyroidism     5. History of thyroid cancer     6. Osteoporosis without current pathological fracture, unspecified osteoporosis type     7. Major depressive disorder with single episode, in full remission (HCC)  venlafaxine (EFFEXOR XR) 37.5 MG extended release capsule   8. Uterine leiomyoma, unspecified location     9. History of breast cancer            Plan:      1. Stable and controlled given her age and health status. Labs normal in April, continue care. 2. Continue crushing meds and thickening liquids. Talk with prhamacist about ability/safety to crush fosamax. 3. Continue potassium supplementation. Monitor every 6 months. 4. Stable on current care. Continue care with endocrine. Continue levothyroxine. 5. Care per endocrine and Onc. Increased uptake on PET scan in Feb, biopsy was negative. Continue care. 6. Continue fosamax weekly. If crushing pill isn't an option, she will need to discuss other options with her endocrine, Onc and PCP. 7. Controlled on effexor, continue. 8. This is the mass Id'd on CT and pelvic ultrasound according to the radiologist. Observe for change in size with yearly imaging. No evidence of bladder cancer was identified on any imaging or PET scan. 9. Continue arimidex. Care per onc. Appears stable. Follow up 6 months: chronic conditions.        Current Outpatient Medications:     venlafaxine (EFFEXOR XR) 37.5 MG extended release capsule, Take 1 capsule by mouth daily, Disp: 90 capsule, Rfl: 1    spironolactone (ALDACTONE) 25 MG tablet, Take 1 tablet by mouth daily, Disp: 90 tablet, Rfl: 1    metoprolol succinate (TOPROL XL) 100 MG extended release tablet, Take 1 tablet by mouth daily, Disp: 90 tablet, Rfl: 1    amLODIPine (NORVASC) 10 MG tablet, Take 1 tablet by mouth daily, Disp: 90

## 2020-08-25 RX ORDER — ALENDRONATE SODIUM 70 MG/1
70 TABLET ORAL
Qty: 12 TABLET | Refills: 3 | OUTPATIENT
Start: 2020-08-25

## 2020-08-25 NOTE — TELEPHONE ENCOUNTER
Requested Prescriptions     Pending Prescriptions Disp Refills    alendronate (FOSAMAX) 70 MG tablet 12 tablet 3     Sig: Take 1 tablet by mouth every 7 days

## 2020-09-02 ENCOUNTER — TELEPHONE (OUTPATIENT)
Dept: FAMILY MEDICINE CLINIC | Age: 75
End: 2020-09-02

## 2020-09-02 NOTE — TELEPHONE ENCOUNTER
Emmanuelle Lucero would like to know if you can sign for it. Emmanuelle Lucero can be reached at [phone].

## 2020-09-02 NOTE — TELEPHONE ENCOUNTER
[address] [facility] asking if you can give vo to continue speech therapy and skilled nursing, or else they will have to discharge patient.

## 2020-10-08 ENCOUNTER — TELEPHONE (OUTPATIENT)
Dept: SURGERY | Age: 75
End: 2020-10-08

## 2020-10-28 ENCOUNTER — HOSPITAL ENCOUNTER (OUTPATIENT)
Dept: WOMENS IMAGING | Age: 75
Discharge: HOME | End: 2020-10-28
Payer: MEDICARE

## 2020-10-28 PROCEDURE — 77063 BREAST TOMOSYNTHESIS BI: CPT

## 2020-10-28 PROCEDURE — 77080 DXA BONE DENSITY AXIAL: CPT

## 2020-12-01 ENCOUNTER — OFFICE VISIT (OUTPATIENT)
Dept: SURGERY | Age: 75
End: 2020-12-01
Payer: MEDICARE

## 2020-12-01 ENCOUNTER — HOSPITAL ENCOUNTER (OUTPATIENT)
Age: 75
Discharge: HOME | End: 2020-12-01
Payer: MEDICARE

## 2020-12-01 ENCOUNTER — HOSPITAL ENCOUNTER (OUTPATIENT)
Dept: GENERAL RADIOLOGY | Age: 75
Discharge: HOME | End: 2020-12-01
Payer: MEDICARE

## 2020-12-01 VITALS
OXYGEN SATURATION: 100 % | BODY MASS INDEX: 21.94 KG/M2 | SYSTOLIC BLOOD PRESSURE: 151 MMHG | WEIGHT: 116.2 LBS | HEIGHT: 61 IN | HEART RATE: 91 BPM | DIASTOLIC BLOOD PRESSURE: 82 MMHG | TEMPERATURE: 98.1 F

## 2020-12-01 PROCEDURE — 99213 OFFICE O/P EST LOW 20 MIN: CPT | Performed by: SURGERY

## 2020-12-01 PROCEDURE — 73552 X-RAY EXAM OF FEMUR 2/>: CPT

## 2021-02-25 ENCOUNTER — OFFICE VISIT (OUTPATIENT)
Dept: FAMILY MEDICINE CLINIC | Age: 76
End: 2021-02-25
Payer: MEDICARE

## 2021-02-25 VITALS
BODY MASS INDEX: 21.16 KG/M2 | WEIGHT: 112 LBS | SYSTOLIC BLOOD PRESSURE: 150 MMHG | OXYGEN SATURATION: 96 % | DIASTOLIC BLOOD PRESSURE: 85 MMHG | HEART RATE: 112 BPM

## 2021-02-25 DIAGNOSIS — R73.9 HYPERGLYCEMIA: ICD-10-CM

## 2021-02-25 DIAGNOSIS — I10 ESSENTIAL HYPERTENSION: Primary | ICD-10-CM

## 2021-02-25 DIAGNOSIS — F32.5 MAJOR DEPRESSIVE DISORDER WITH SINGLE EPISODE, IN FULL REMISSION (HCC): ICD-10-CM

## 2021-02-25 DIAGNOSIS — E03.9 ACQUIRED HYPOTHYROIDISM: ICD-10-CM

## 2021-02-25 DIAGNOSIS — E87.6 HYPOKALEMIA: ICD-10-CM

## 2021-02-25 DIAGNOSIS — R30.0 DYSURIA: ICD-10-CM

## 2021-02-25 DIAGNOSIS — Z90.49 H/O COLECTOMY: ICD-10-CM

## 2021-02-25 DIAGNOSIS — Z13.220 LIPID SCREENING: ICD-10-CM

## 2021-02-25 DIAGNOSIS — R06.02 SOB (SHORTNESS OF BREATH): ICD-10-CM

## 2021-02-25 LAB
BILIRUBIN, POC: NEGATIVE
BLOOD URINE, POC: NEGATIVE
CLARITY, POC: CLEAR
COLOR, POC: YELLOW
GLUCOSE URINE, POC: NEGATIVE
KETONES, POC: NEGATIVE
LEUKOCYTE EST, POC: NEGATIVE
NITRITE, POC: NEGATIVE
PH, POC: 5
PROTEIN, POC: NORMAL
SPECIFIC GRAVITY, POC: 1.02
UROBILINOGEN, POC: NORMAL

## 2021-02-25 PROCEDURE — 81002 URINALYSIS NONAUTO W/O SCOPE: CPT | Performed by: FAMILY MEDICINE

## 2021-02-25 PROCEDURE — 99214 OFFICE O/P EST MOD 30 MIN: CPT | Performed by: FAMILY MEDICINE

## 2021-02-25 RX ORDER — AMLODIPINE BESYLATE 10 MG/1
10 TABLET ORAL DAILY
Qty: 90 TABLET | Refills: 3 | Status: SHIPPED | OUTPATIENT
Start: 2021-02-25 | End: 2022-02-09 | Stop reason: SDUPTHER

## 2021-02-25 RX ORDER — METOPROLOL SUCCINATE 100 MG/1
100 TABLET, EXTENDED RELEASE ORAL DAILY
Qty: 90 TABLET | Refills: 3 | Status: SHIPPED | OUTPATIENT
Start: 2021-02-25 | End: 2022-02-09 | Stop reason: SDUPTHER

## 2021-02-25 RX ORDER — VENLAFAXINE HYDROCHLORIDE 37.5 MG/1
37.5 CAPSULE, EXTENDED RELEASE ORAL DAILY
Qty: 90 CAPSULE | Refills: 3 | Status: SHIPPED | OUTPATIENT
Start: 2021-02-25 | End: 2022-02-09 | Stop reason: SDUPTHER

## 2021-02-25 RX ORDER — SPIRONOLACTONE 25 MG/1
25 TABLET ORAL DAILY
Qty: 90 TABLET | Refills: 3 | Status: SHIPPED | OUTPATIENT
Start: 2021-02-25 | End: 2022-02-09 | Stop reason: SDUPTHER

## 2021-02-25 NOTE — PROGRESS NOTES
Emani Land  1945 02/28/21    Chief Complaint   Patient presents with   Milad Fish Doctor     Patient here to establish care with PCP    Other     Patient states not feeling well. PAtient denies fever but having chills           78 yrs old lady with PMH HTN, hypothyroidism, s/p thyroid cancer, depression, h/o colostomy bag since 25 yrs old, because of ulcerative colitis, patient also c/o not feeling well, has some SOB, chills, no fever, no cough, no headache, stats has some lower abdominal pressure, was having UTI 2 wks ago and was treated. No N/V. 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 Leon Sifuentes [facility]    [facility]     [facility])     Nausea & vomiting     \"got sick with one surgery\"    Thyroid ca [facility])     had thyroid cancer in 2005- tx with radioactive iodine and radiation    Thyroid disease     hypo    Ulcerative colitis ([address]      Past Surgical History:   Procedure Laterality Date    APPENDECTOMY      BREAST LUMPECTOMY Left 10/13/14    left lumpectomy with needle localization    BREAST SURGERY      2001 lumpectomy right breast    ILEOSTOMY OR JEJUNOSTOMY Right 1966    colon surgery\"removed most of the large and part of small intestine\"'removed appendix at that time    JOINT REPLACEMENT Right     hip   [address]    \"removed a tumor from the left lung\"   [address]  as a child     Family History   Problem Relation Age of Onset    High Blood Pressure Mother     Cancer Father         liver cancer     Social History     Socioeconomic History    Marital status:       Spouse name: Not on file    Number of children: Not on file    Years of education: Not on file    Highest education level: Not on file Occupational History    Not on file   Social Needs    Financial resource strain: Not on file    Food insecurity     Worry: Not on file     Inability: Not on file    Transportation needs     Medical: Not on file     Non-medical: Not on file   Tobacco Use    Smoking status: Never Smoker    Smokeless tobacco: Never Used   Substance and Sexual Activity    Alcohol use: Not Currently     Comment: average 3-4 times per year    Drug use: No    Sexual activity: Not on file   Lifestyle    Physical activity     Days per week: Not on file     Minutes per session: Not on file    Stress: Not on file   Relationships    Social connections     Talks on phone: Not on file     Gets together: Not on file     Attends Restorationist service: Not on file     Active member of club or organization: Not on file     Attends meetings of clubs or organizations: Not on file     Relationship status: Not on file    Intimate partner violence     Fear of current or ex partner: Not on file     Emotionally abused: Not on file     Physically abused: Not on file     Forced sexual activity: Not on file   Other Topics Concern    Not on file   Social History Narrative    Not on file       Allergies   Allergen Reactions    Codeine Nausea And Vomiting    Biaxin [Clarithromycin] Nausea Only    Demerol Hcl [Meperidine]      \"room spins\"  \"room spins\"    Sulfa Antibiotics Rash     Current Outpatient Medications   Medication Sig Dispense Refill    spironolactone (ALDACTONE) 25 MG tablet Take 1 tablet by mouth daily 90 tablet 3    metoprolol succinate (TOPROL XL) 100 MG extended release tablet Take 1 tablet by mouth daily 90 tablet 3    amLODIPine (NORVASC) 10 MG tablet Take 1 tablet by mouth daily 90 tablet 3    venlafaxine (EFFEXOR XR) 37.5 MG extended release capsule Take 1 capsule by mouth daily 90 capsule 3    potassium chloride (KLOR-CON M) 20 MEQ extended release tablet Take 1 tablet by mouth daily 90 tablet 1    anastrozole (ARIMIDEX) 1 MG tablet TAKE ONE TABLET BY MOUTH ONCE DAILY 90 tablet 3    alendronate (FOSAMAX) 70 MG tablet Take 1 tablet by mouth every 7 days 12 tablet 3    Levothyroxine Sodium 88 MCG CAPS Take 88 mcg by mouth Daily       calcium-vitamin D (OSCAL-500) 500-200 MG-UNIT per tablet Take 1 tablet by mouth daily.  albuterol sulfate HFA (PROVENTIL HFA) 108 (90 Base) MCG/ACT inhaler Inhale 2 puffs into the lungs       No current facility-administered medications for this visit. Review of Systems   Constitutional: Positive for activity change and chills. Negative for appetite change, fatigue and fever. HENT: Negative for congestion, postnasal drip, sinus pressure and sore throat. Respiratory: Positive for shortness of breath. Negative for cough and chest tightness. Cardiovascular: Negative for chest pain and leg swelling. Gastrointestinal: Negative for abdominal pain, constipation and diarrhea. Genitourinary: Positive for dysuria. Negative for frequency. Musculoskeletal: Negative for back pain. Skin: Negative for rash. Neurological: Negative for dizziness and headaches. Psychiatric/Behavioral: Negative for agitation, behavioral problems and sleep disturbance. The patient is not nervous/anxious.         Lab Results   Component Value Date    WBC 7.0 10/19/2014    HGB 12.9 10/19/2014    HCT 37.8 10/19/2014    MCV 86.2 10/19/2014     10/19/2014     Lab Results   Component Value Date     04/24/2020    K 4.7 04/24/2020    CL 98 04/24/2020    CO2 27 04/24/2020    BUN 26 04/24/2020    CREATININE 1.7 04/24/2020    GLUCOSE 117 04/24/2020    CALCIUM 10.9 04/24/2020    LABGLOM 29 04/24/2020    GFRAA 47 (L) 08/13/2018     Lab Results   Component Value Date    TSH 2.900 04/24/2020         BP (!) 150/85 (Site: Left Upper Arm, Position: Sitting, Cuff Size: Medium Adult)   Pulse 112   Wt 112 lb (50.8 kg)   SpO2 96%   BMI 21.16 kg/m²     BP Readings from Last 3 Encounters:   02/25/21 (!) 150/85   12/01/20 (!) 151/82   08/24/20 (!) 144/80       Wt Readings from Last 3 Encounters:   02/25/21 112 lb (50.8 kg)   12/01/20 116 lb 3.2 oz (52.7 kg)   08/24/20 112 lb 6.4 oz (51 kg)         Physical Exam  Constitutional:       General: She is not in acute distress. Appearance: Normal appearance. She is well-developed. She is not ill-appearing or diaphoretic. HENT:      Head: Normocephalic and atraumatic. Neck:      Musculoskeletal: Normal range of motion and neck supple. No neck rigidity. Cardiovascular:      Rate and Rhythm: Normal rate and regular rhythm. Heart sounds: Normal heart sounds. No murmur. Pulmonary:      Effort: Pulmonary effort is normal.      Breath sounds: Normal breath sounds. No wheezing. Abdominal:      General: There is no distension. Palpations: Abdomen is soft. There is no mass. Tenderness: There is no abdominal tenderness. Comments: Colostomy bag in place   Musculoskeletal: Normal range of motion. Right lower leg: No edema. Left lower leg: No edema. Neurological:      General: No focal deficit present. Mental Status: She is alert and oriented to person, place, and time. Psychiatric:         Mood and Affect: Mood normal.         Behavior: Behavior normal.         ASSESSMENT/ PLAN:    1. Essential hypertension  - little elevated will recheck in one month  - spironolactone (ALDACTONE) 25 MG tablet; Take 1 tablet by mouth daily  Dispense: 90 tablet; Refill: 3  - metoprolol succinate (TOPROL XL) 100 MG extended release tablet; Take 1 tablet by mouth daily  Dispense: 90 tablet; Refill: 3  - amLODIPine (NORVASC) 10 MG tablet; Take 1 tablet by mouth daily  Dispense: 90 tablet; Refill: 3  - Comprehensive Metabolic Panel, Fasting; Future  - CBC Auto Differential; Future    2. Acquired hypothyroidism  - will check:  - TSH without Reflex; Future  - T4, Free; Future    3.  Major depressive disorder with single episode, in full remission ([facility]  - stable  - venlafaxine (EFFEXOR XR) 37.5 MG extended release capsule; Take 1 capsule by mouth daily  Dispense: 90 capsule; Refill: 3    4. H/O colectomy  - Vitamin B12 & Folate; Future    5. SOB (shortness of breath)  - XR CHEST STANDARD (2 VW); Future    6. Dysuria  - POCT Urinalysis no Micro, negative    7. Hypokalemia  - spironolactone (ALDACTONE) 25 MG tablet; Take 1 tablet by mouth daily  Dispense: 90 tablet; Refill: 3    8. Hyperglycemia  - Hemoglobin A1C; Future    9. Lipid screening  - Lipid Panel; Future              - All old blood work reviewed with the patient  - Appropriate prescription are addressed. - After visit summery provided. - Questions answered and patient verbalizes understanding.  - Call for any problem, questions, or concerns.  - RTC if symptoms worse. Return in about 1 month (around 3/25/2021).

## 2021-02-28 PROBLEM — Z90.49 H/O COLECTOMY: Status: ACTIVE | Noted: 2021-02-28

## 2021-02-28 ASSESSMENT — ENCOUNTER SYMPTOMS
BACK PAIN: 0
SHORTNESS OF BREATH: 1
CHEST TIGHTNESS: 0
COUGH: 0
SINUS PRESSURE: 0
CONSTIPATION: 0
ABDOMINAL PAIN: 0
DIARRHEA: 0
SORE THROAT: 0

## 2021-03-01 ENCOUNTER — HOSPITAL ENCOUNTER (OUTPATIENT)
Age: 76
Discharge: HOME | End: 2021-03-01
Payer: MEDICARE

## 2021-03-01 ENCOUNTER — HOSPITAL ENCOUNTER (OUTPATIENT)
Dept: GENERAL RADIOLOGY | Age: 76
Discharge: HOME | End: 2021-03-01
Payer: MEDICARE

## 2021-03-01 DIAGNOSIS — R06.02 SOB (SHORTNESS OF BREATH): ICD-10-CM

## 2021-03-01 LAB
ALBUMIN SERPL-MCNC: 4.7 GM/DL (ref 3.4–5)
ALP BLD-CCNC: 62 IU/L (ref 40–128)
ALT SERPL-CCNC: 24 U/L (ref 10–40)
ANION GAP SERPL CALCULATED.3IONS-SCNC: 16 MMOL/L (ref 4–16)
AST SERPL-CCNC: 35 IU/L (ref 15–37)
BILIRUB SERPL-MCNC: 0.4 MG/DL (ref 0–1)
BUN BLDV-MCNC: 24 MG/DL (ref 6–23)
CALCIUM SERPL-MCNC: 9.3 MG/DL (ref 8.3–10.6)
CHLORIDE BLD-SCNC: 104 MMOL/L (ref 99–110)
CHOLESTEROL: 256 MG/DL
CO2: 18 MMOL/L (ref 21–32)
CREAT SERPL-MCNC: 1.2 MG/DL (ref 0.6–1.1)
ESTIMATED AVERAGE GLUCOSE: 120 MG/DL
GFR AFRICAN AMERICAN: 53 ML/MIN/1.73M2
GFR NON-AFRICAN AMERICAN: 44 ML/MIN/1.73M2
GLUCOSE BLD-MCNC: 110 MG/DL (ref 70–99)
HBA1C MFR BLD: 5.8 % (ref 4.2–6.3)
HDLC SERPL-MCNC: 87 MG/DL
LDL CHOLESTEROL DIRECT: 160 MG/DL
POTASSIUM SERPL-SCNC: 5 MMOL/L (ref 3.5–5.1)
REASON FOR REJECTION: NORMAL
REJECTED TEST: NORMAL
SODIUM BLD-SCNC: 138 MMOL/L (ref 135–145)
T4 FREE: 2.18 NG/DL (ref 0.9–1.8)
TOTAL PROTEIN: 7.1 GM/DL (ref 6.4–8.2)
TRIGL SERPL-MCNC: 93 MG/DL
TSH HIGH SENSITIVITY: 0.04 UIU/ML (ref 0.27–4.2)

## 2021-03-01 PROCEDURE — 82607 VITAMIN B-12: CPT

## 2021-03-01 PROCEDURE — 83036 HEMOGLOBIN GLYCOSYLATED A1C: CPT

## 2021-03-01 PROCEDURE — 84439 ASSAY OF FREE THYROXINE: CPT

## 2021-03-01 PROCEDURE — 84443 ASSAY THYROID STIM HORMONE: CPT

## 2021-03-01 PROCEDURE — 71046 X-RAY EXAM CHEST 2 VIEWS: CPT

## 2021-03-01 PROCEDURE — 82746 ASSAY OF FOLIC ACID SERUM: CPT

## 2021-03-01 PROCEDURE — 83721 ASSAY OF BLOOD LIPOPROTEIN: CPT

## 2021-03-01 PROCEDURE — 80053 COMPREHEN METABOLIC PANEL: CPT

## 2021-03-01 PROCEDURE — 80061 LIPID PANEL: CPT

## 2021-03-01 PROCEDURE — 36415 COLL VENOUS BLD VENIPUNCTURE: CPT

## 2021-03-02 ENCOUNTER — HOSPITAL ENCOUNTER (OUTPATIENT)
Age: 76
Setting detail: SPECIMEN
Discharge: HOME | End: 2021-03-02
Payer: MEDICARE

## 2021-03-02 LAB
BASOPHILS ABSOLUTE: 0.1 K/CU MM
BASOPHILS RELATIVE PERCENT: 1.1 % (ref 0–1)
DIFFERENTIAL TYPE: ABNORMAL
EOSINOPHILS ABSOLUTE: 0.2 K/CU MM
EOSINOPHILS RELATIVE PERCENT: 3.4 % (ref 0–3)
FOLATE: 16.5 NG/ML (ref 3.1–17.5)
HCT VFR BLD CALC: 43.6 % (ref 37–47)
HEMOGLOBIN: 13 GM/DL (ref 12.5–16)
IMMATURE NEUTROPHIL %: 0.2 % (ref 0–0.43)
LYMPHOCYTES ABSOLUTE: 0.8 K/CU MM
LYMPHOCYTES RELATIVE PERCENT: 12.4 % (ref 24–44)
MCH RBC QN AUTO: 29.3 PG (ref 27–31)
MCHC RBC AUTO-ENTMCNC: 29.8 % (ref 32–36)
MCV RBC AUTO: 98.4 FL (ref 78–100)
MONOCYTES ABSOLUTE: 0.4 K/CU MM
MONOCYTES RELATIVE PERCENT: 6.3 % (ref 0–4)
NUCLEATED RBC %: 0 %
PDW BLD-RTO: 13.8 % (ref 11.7–14.9)
PLATELET # BLD: 272 K/CU MM (ref 140–440)
PMV BLD AUTO: 11.2 FL (ref 7.5–11.1)
RBC # BLD: 4.43 M/CU MM (ref 4.2–5.4)
SEGMENTED NEUTROPHILS ABSOLUTE COUNT: 4.8 K/CU MM
SEGMENTED NEUTROPHILS RELATIVE PERCENT: 76.6 % (ref 36–66)
TOTAL IMMATURE NEUTOROPHIL: 0.01 K/CU MM
TOTAL NUCLEATED RBC: 0 K/CU MM
VITAMIN B-12: 579.8 PG/ML (ref 211–911)
WBC # BLD: 6.2 K/CU MM (ref 4–10.5)

## 2021-03-02 PROCEDURE — 82746 ASSAY OF FOLIC ACID SERUM: CPT

## 2021-03-02 PROCEDURE — 82607 VITAMIN B-12: CPT

## 2021-03-02 PROCEDURE — 85025 COMPLETE CBC W/AUTO DIFF WBC: CPT

## 2021-03-02 PROCEDURE — 36415 COLL VENOUS BLD VENIPUNCTURE: CPT

## 2021-03-08 ENCOUNTER — TELEPHONE (OUTPATIENT)
Dept: FAMILY MEDICINE CLINIC | Age: 76
End: 2021-03-08

## 2021-03-10 NOTE — TELEPHONE ENCOUNTER
Patient informed and confirmed understanding.  Patient currently ay [facility] pt coughing up blood

## 2021-04-01 ENCOUNTER — OFFICE VISIT (OUTPATIENT)
Dept: FAMILY MEDICINE CLINIC | Age: 76
End: 2021-04-01
Payer: MEDICARE

## 2021-04-01 VITALS
HEART RATE: 73 BPM | SYSTOLIC BLOOD PRESSURE: 130 MMHG | BODY MASS INDEX: 21.45 KG/M2 | OXYGEN SATURATION: 98 % | DIASTOLIC BLOOD PRESSURE: 68 MMHG | WEIGHT: 113.6 LBS | HEIGHT: 61 IN

## 2021-04-01 VITALS
OXYGEN SATURATION: 98 % | WEIGHT: 113.6 LBS | HEIGHT: 61 IN | BODY MASS INDEX: 21.45 KG/M2 | DIASTOLIC BLOOD PRESSURE: 68 MMHG | SYSTOLIC BLOOD PRESSURE: 130 MMHG | HEART RATE: 73 BPM

## 2021-04-01 DIAGNOSIS — I10 ESSENTIAL HYPERTENSION: Primary | ICD-10-CM

## 2021-04-01 DIAGNOSIS — E03.9 ACQUIRED HYPOTHYROIDISM: ICD-10-CM

## 2021-04-01 DIAGNOSIS — E78.5 HYPERLIPIDEMIA, UNSPECIFIED HYPERLIPIDEMIA TYPE: ICD-10-CM

## 2021-04-01 DIAGNOSIS — Z00.00 ROUTINE GENERAL MEDICAL EXAMINATION AT A HEALTH CARE FACILITY: Primary | ICD-10-CM

## 2021-04-01 DIAGNOSIS — M81.0 OSTEOPOROSIS WITHOUT CURRENT PATHOLOGICAL FRACTURE, UNSPECIFIED OSTEOPOROSIS TYPE: ICD-10-CM

## 2021-04-01 PROCEDURE — G0439 PPPS, SUBSEQ VISIT: HCPCS | Performed by: FAMILY MEDICINE

## 2021-04-01 PROCEDURE — 99214 OFFICE O/P EST MOD 30 MIN: CPT | Performed by: FAMILY MEDICINE

## 2021-04-01 RX ORDER — ATORVASTATIN CALCIUM 10 MG/1
10 TABLET, FILM COATED ORAL DAILY
Qty: 30 TABLET | Refills: 3 | Status: SHIPPED | OUTPATIENT
Start: 2021-04-01 | End: 2021-08-02

## 2021-04-01 RX ORDER — ALENDRONATE SODIUM 70 MG/1
70 TABLET ORAL
Qty: 12 TABLET | Refills: 3 | Status: SHIPPED | OUTPATIENT
Start: 2021-04-01 | End: 2022-03-31

## 2021-04-01 ASSESSMENT — PATIENT HEALTH QUESTIONNAIRE - PHQ9
1. LITTLE INTEREST OR PLEASURE IN DOING THINGS: 0
SUM OF ALL RESPONSES TO PHQ QUESTIONS 1-9: 0

## 2021-04-01 ASSESSMENT — LIFESTYLE VARIABLES: HOW OFTEN DO YOU HAVE A DRINK CONTAINING ALCOHOL: 0

## 2021-04-01 NOTE — PATIENT INSTRUCTIONS
Personalized Preventive Plan for [facility] - 4/1/2021  Medicare offers a range of preventive health benefits. Some of the tests and screenings are paid in full while other may be subject to a deductible, co-insurance, and/or copay. Some of these benefits include a comprehensive review of your medical history including lifestyle, illnesses that may run in your family, and various assessments and screenings as appropriate. After reviewing your medical record and screening and assessments performed today your provider may have ordered immunizations, labs, imaging, and/or referrals for you. A list of these orders (if applicable) as well as your Preventive Care list are included within your After Visit Summary for your review. Other Preventive Recommendations:    · A preventive eye exam performed by an eye specialist is recommended every 1-2 years to screen for glaucoma; cataracts, macular degeneration, and other eye disorders. · A preventive dental visit is recommended every 6 months. · Try to get at least 150 minutes of exercise per week or 10,000 steps per day on a pedometer . · Order or download the FREE \"Exercise & Physical Activity: Your Everyday Guide\" from [employer]. Call 0-961.340.6925 or search [employer] online. · You need 9720-7489 mg of calcium and 5514-2174 IU of vitamin D per day. It is possible to meet your calcium requirement with diet alone, but a vitamin D supplement is usually necessary to meet this goal.  · When exposed to the sun, use a sunscreen that protects against both UVA and UVB radiation with an SPF of 30 or greater. Reapply every 2 to 3 hours or after sweating, drying off with a towel, or swimming. · Always wear a seat belt when traveling in a car. Always wear a helmet when riding a bicycle or motorcycle.

## 2021-04-01 NOTE — PROGRESS NOTES
Michelle Adrian  1945 04/03/21    Chief Complaint   Patient presents with    1 Month Follow-Up    Hypertension     and discuss labs           Patient here for 1 month f/u regarding HTN, hypothyroidism, s/p thyroid cancer, depression, h/o colostomy bag since 25 yrs old, because of ulcerative colitis. Patient also here to discuss the lab results. 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 Baldo Hilliard at [facility]    Hypertension     [facility]leostomy care [facility])     Nausea & vomiting     \"got sick with one surgery\"    Thyroid ca [facility])     had thyroid cancer in 2005- tx with radioactive iodine and radiation    Thyroid disease     hypo    Ulcerative colitis ([address]      Past Surgical History:   Procedure Laterality Date    APPENDECTOMY      BREAST LUMPECTOMY Left 10/13/14    left lumpectomy with needle localization    BREAST SURGERY      2001 lumpectomy right breast    ILEOSTOMY OR JEJUNOSTOMY Right 1966    colon surgery\"removed most of the large and part of small intestine\"'removed appendix at that time    JOINT REPLACEMENT Right     hip   [address]    \"removed a tumor from the left lung\"   [address] [facility]  as a child     Family History   Problem Relation Age of Onset    High Blood Pressure Mother     Cancer Father         liver cancer     Social History     Socioeconomic History    Marital status:       Spouse name: Not on file    Number of children: Not on file    Years of education: Not on file    Highest education level: Not on file   Occupational History    Not on file   Social Needs    Financial resource strain: Not on file    Food insecurity     Worry: Not on file     Inability: Not on file    Transportation needs     Medical: Not on file tablet 3    Levothyroxine Sodium 88 MCG CAPS Take 88 mcg by mouth Daily       calcium-vitamin D (OSCAL-500) 500-200 MG-UNIT per tablet Take 1 tablet by mouth daily.  albuterol sulfate HFA (PROVENTIL HFA) 108 (90 Base) MCG/ACT inhaler Inhale 2 puffs into the lungs       No current facility-administered medications for this visit. Review of Systems   Constitutional: Positive for activity change. Negative for appetite change, chills, fatigue and fever. HENT: Negative for congestion. Respiratory: Positive for shortness of breath. Negative for cough. Cardiovascular: Negative for chest pain and leg swelling. Genitourinary: Negative for frequency. Skin: Negative for rash. Neurological: Negative for dizziness and headaches. Psychiatric/Behavioral: Negative for agitation, behavioral problems and sleep disturbance. The patient is not nervous/anxious.         Lab Results   Component Value Date    WBC 6.2 03/02/2021    HGB 13.0 03/02/2021    HCT 43.6 03/02/2021    MCV 98.4 03/02/2021     03/02/2021     Lab Results   Component Value Date     03/01/2021    K 5.0 03/01/2021     03/01/2021    CO2 18 (L) 03/01/2021    BUN 24 (H) 03/01/2021    CREATININE 1.2 (H) 03/01/2021    GLUCOSE 110 (H) 03/01/2021    CALCIUM 9.3 03/01/2021    PROT 7.1 03/01/2021    LABALBU 4.7 03/01/2021    BILITOT 0.4 03/01/2021    ALKPHOS 62 03/01/2021    AST 35 03/01/2021    ALT 24 03/01/2021    LABGLOM 44 (L) 03/01/2021    GFRAA 53 (L) 03/01/2021     Lab Results   Component Value Date    CHOL 256 (H) 03/01/2021     Lab Results   Component Value Date    TRIG 93 03/01/2021     Lab Results   Component Value Date    HDL 87 03/01/2021     No results found for: [facility], LDLCHOLESTEROL  Lab Results   Component Value Date    LABA1C 5.8 03/01/2021     Lab Results   Component Value Date    TSH 2.900 04/24/2020    TSHHS 0.039 (L) 03/01/2021         /68 (Site: Left Upper Arm, Position: Sitting, Cuff Size: Medium Adult)   Pulse 73   Ht 5' 1\" (1.549 m)   Wt 113 lb 9.6 oz (51.5 kg)   SpO2 98%   BMI 21.46 kg/m²     BP Readings from Last 3 Encounters:   04/01/21 130/68   04/01/21 130/68   02/25/21 (!) 150/85       Wt Readings from Last 3 Encounters:   04/01/21 113 lb 9.6 oz (51.5 kg)   04/01/21 113 lb 9.6 oz (51.5 kg)   02/25/21 112 lb (50.8 kg)         Physical Exam  Constitutional:       General: She is not in acute distress. Appearance: Normal appearance. She is well-developed. She is not ill-appearing or diaphoretic. HENT:      Head: Normocephalic and atraumatic. Neck:      Musculoskeletal: Normal range of motion and neck supple. No neck rigidity. Cardiovascular:      Rate and Rhythm: Normal rate and regular rhythm. Heart sounds: Normal heart sounds. No murmur. Pulmonary:      Effort: Pulmonary effort is normal.      Breath sounds: Normal breath sounds. No wheezing. Abdominal:      Comments: Colostomy bag in place   Musculoskeletal: Normal range of motion. Right lower leg: No edema. Left lower leg: No edema. Neurological:      General: No focal deficit present. Mental Status: She is alert and oriented to person, place, and time. Psychiatric:         Mood and Affect: Mood normal.         Behavior: Behavior normal.         ASSESSMENT/ PLAN:    1. Essential hypertension  - better, continue same medication    2. Acquired hypothyroidism  - stable    3. Osteoporosis without current pathological fracture, unspecified osteoporosis type  - alendronate (FOSAMAX) 70 MG tablet; Take 1 tablet by mouth every 7 days  Dispense: 12 tablet; Refill: 3    4. Hyperlipidemia, unspecified hyperlipidemia type  - start:  - atorvastatin (LIPITOR) 10 MG tablet; Take 1 tablet by mouth daily  Dispense: 30 tablet; Refill: 3              - All old blood work reviewed with the patient  - Appropriate prescription are addressed. - After visit summery provided.   - Questions answered and patient verbalizes understanding.  - Call for any problem, questions, or concerns. Return in about 3 months (around 7/1/2021).

## 2021-04-01 NOTE — PROGRESS NOTES
Diagnosis Date    [facility]     Breast cancer ([facility]     Cancer (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 Abena Pope at [facility]    Hypertension     [facility])     Nausea & vomiting     \"got sick with one surgery\"    Thyroid ca [facility])     had thyroid cancer in 2005- tx with radioactive iodine and radiation    Thyroid disease     hypo    Ulcerative colitis ([facility]        Past Surgical History:   Procedure Laterality Date    APPENDECTOMY      BREAST LUMPECTOMY Left 10/13/14    left lumpectomy with needle localization    BREAST SURGERY      2001 lumpectomy right breast    ILEOSTOMY OR JEJUNOSTOMY Right 1966    colon surgery\"removed most of the large and part of small intestine\"'removed appendix at that time    JOINT REPLACEMENT Right     hip   [address]    \"removed a tumor from the left lung\"   [address] [facility]  as a child         Family History   Problem Relation Age of Onset    High Blood Pressure Mother     Cancer Father         liver cancer       CareTeam (Including outside providers/suppliers regularly involved in providing care):   Patient Care Team:  Sheela Priest MD as PCP - General (Family Medicine)  Sheela Priest MD as PCP - [facility] Empaneled Provider    Wt Readings from Last 3 Encounters:   04/01/21 113 lb 9.6 oz (51.5 kg)   04/01/21 113 lb 9.6 oz (51.5 kg)   02/25/21 112 lb (50.8 kg)     Vitals:    04/01/21 0939   BP: 130/68   Site: Left Upper Arm   Position: Sitting   Cuff Size: Medium Adult   Pulse: 73   SpO2: 98%   Weight: 113 lb 9.6 oz (51.5 kg)   Height: 5' 1\" (1.549 m)     Body mass index is 21.46 kg/m². Based upon direct observation of the patient, evaluation of cognition reveals recent and remote memory intact.         Patient's complete Health Risk Assessment and screening values have been reviewed and are found in Flowsheets. The following problems were reviewed today and where indicated follow up appointments were made and/or referrals ordered. Positive Risk Factor Screenings with Interventions:            General Health and ACP:  General  In general, how would you say your health is?: Good  In the past 7 days, have you experienced any of the following?  New or Increased Pain, New or Increased Fatigue, Loneliness, Social Isolation, Stress or Anger?: None of These  Do you get the social and emotional support that you need?: Yes  Do you have a Living Will?: Yes  Advance Directives     Power of  Living Will ACP-Advance Directive ACP-Power of     Not on File Not on File Not on File Not on File      General Health Risk Interventions:  · need to work on the living will   · Not driving  · No assisting walking device    Health Habits/Nutrition:  Health Habits/Nutrition  Do you exercise for at least 20 minutes 2-3 times per week?: (!) No  Have you lost any weight without trying in the past 3 months?: No  Do you eat only one meal per day?: No  Have you seen the dentist within the past year?: Yes  Body mass index: 21.46  Health Habits/Nutrition Interventions:  · doing fine, see a dentist    Hearing/Vision:  No exam data present  Hearing/Vision  Do you or your family notice any trouble with your hearing that hasn't been managed with hearing aids?: No  Do you have difficulty driving, watching TV, or doing any of your daily activities because of your eyesight?: (!) Yes  Have you had an eye exam within the past year?: (!) No  Hearing/Vision Interventions:  · both okay      Personalized Preventive Plan   Current Health Maintenance Status  Immunization History   Administered Date(s) Administered    COVID-19, Moderna, PF, 100mcg/0.5mL 02/03/2021, 03/03/2021    Influenza, High Dose (Fluzone 65 yrs and older) 09/22/2015, 10/10/2016    Influenza, Quadv, IM, PF (6 mo and older Fluzone, Flulaval, Fluarix, and 3 yrs and older

## 2021-04-03 PROBLEM — E78.5 HYPERLIPIDEMIA: Status: ACTIVE | Noted: 2021-04-03

## 2021-04-03 ASSESSMENT — ENCOUNTER SYMPTOMS
COUGH: 0
SHORTNESS OF BREATH: 1

## 2021-04-13 RX ORDER — ANASTROZOLE 1 MG/1
TABLET ORAL
Qty: 90 TABLET | Refills: 3 | Status: SHIPPED | OUTPATIENT
Start: 2021-04-13 | End: 2022-06-16 | Stop reason: SDUPTHER

## 2021-05-10 DIAGNOSIS — E87.6 HYPOKALEMIA: ICD-10-CM

## 2021-05-11 RX ORDER — POTASSIUM CHLORIDE 20 MEQ/1
20 TABLET, EXTENDED RELEASE ORAL DAILY
Qty: 90 TABLET | Refills: 1 | Status: SHIPPED | OUTPATIENT
Start: 2021-05-11 | End: 2021-12-09

## 2021-07-06 ENCOUNTER — OFFICE VISIT (OUTPATIENT)
Dept: FAMILY MEDICINE CLINIC | Age: 76
End: 2021-07-06
Payer: MEDICARE

## 2021-07-06 VITALS
HEART RATE: 99 BPM | BODY MASS INDEX: 21.69 KG/M2 | OXYGEN SATURATION: 96 % | WEIGHT: 114.8 LBS | SYSTOLIC BLOOD PRESSURE: 140 MMHG | DIASTOLIC BLOOD PRESSURE: 85 MMHG

## 2021-07-06 DIAGNOSIS — F32.5 MAJOR DEPRESSIVE DISORDER WITH SINGLE EPISODE, IN FULL REMISSION (HCC): ICD-10-CM

## 2021-07-06 DIAGNOSIS — E03.9 ACQUIRED HYPOTHYROIDISM: ICD-10-CM

## 2021-07-06 DIAGNOSIS — I10 ESSENTIAL HYPERTENSION: Primary | ICD-10-CM

## 2021-07-06 DIAGNOSIS — E78.5 HYPERLIPIDEMIA, UNSPECIFIED HYPERLIPIDEMIA TYPE: ICD-10-CM

## 2021-07-06 DIAGNOSIS — M81.0 OSTEOPOROSIS WITHOUT CURRENT PATHOLOGICAL FRACTURE, UNSPECIFIED OSTEOPOROSIS TYPE: ICD-10-CM

## 2021-07-06 PROCEDURE — 99214 OFFICE O/P EST MOD 30 MIN: CPT | Performed by: FAMILY MEDICINE

## 2021-07-06 NOTE — PROGRESS NOTES
average 3-4 times per year    Drug use: No    Sexual activity: Not on file   Other Topics Concern    Not on file   Social History Narrative    Not on file     Social Determinants of Health     Financial Resource Strain:     Difficulty of Paying Living Expenses:    Food Insecurity:     Worried About Running Out of Food in the Last Year:     [address] in the Last Year:    Transportation Needs:     Lack of Transportation (Medical):  Lack of Transportation (Non-Medical):    Physical Activity:     Days of Exercise per Week:     Minutes of Exercise per Session:    Stress:     Feeling of Stress :    Social Connections:     Frequency of Communication with Friends and Family:     Frequency of Social Gatherings with Friends and Family:     Attends Jain Services:     Active Member of Clubs or Organizations:     Attends Club or Organization Meetings:     Marital Status:    Intimate Partner Violence:     Fear of Current or Ex-Partner:     Emotionally Abused:     Physically Abused:     Sexually Abused:         Allergies   Allergen Reactions    Codeine Nausea And Vomiting    Biaxin [Clarithromycin] Nausea Only    Demerol Hcl [Meperidine]      \"room spins\"  \"room spins\"    Sulfa Antibiotics Rash     Current Outpatient Medications   Medication Sig Dispense Refill    potassium chloride (KLOR-CON M) 20 MEQ extended release tablet Take 1 tablet by mouth daily 90 tablet 1    anastrozole (ARIMIDEX) 1 MG tablet TAKE ONE TABLET BY MOUTH ONCE DAILY 90 tablet 3    atorvastatin (LIPITOR) 10 MG tablet Take 1 tablet by mouth daily 30 tablet 3    alendronate (FOSAMAX) 70 MG tablet Take 1 tablet by mouth every 7 days 12 tablet 3    spironolactone (ALDACTONE) 25 MG tablet Take 1 tablet by mouth daily 90 tablet 3    metoprolol succinate (TOPROL XL) 100 MG extended release tablet Take 1 tablet by mouth daily 90 tablet 3    amLODIPine (NORVASC) 10 MG tablet Take 1 tablet by mouth daily 90 tablet 3    venlafaxine (EFFEXOR XR) 37.5 MG extended release capsule Take 1 capsule by mouth daily 90 capsule 3    Levothyroxine Sodium 88 MCG CAPS Take 88 mcg by mouth Daily       calcium-vitamin D (OSCAL-500) 500-200 MG-UNIT per tablet Take 1 tablet by mouth daily.  albuterol sulfate HFA (PROVENTIL HFA) 108 (90 Base) MCG/ACT inhaler Inhale 2 puffs into the lungs       No current facility-administered medications for this visit. Review of Systems   Constitutional: Negative for activity change, appetite change, chills, fatigue and fever. HENT: Negative for congestion. Respiratory: Positive for shortness of breath. Negative for cough. Cardiovascular: Negative for chest pain and leg swelling. Genitourinary: Negative for frequency. Skin: Negative for rash. Neurological: Negative for dizziness and headaches. Psychiatric/Behavioral: Negative for agitation, behavioral problems and sleep disturbance. The patient is not nervous/anxious.         Lab Results   Component Value Date    WBC 6.2 03/02/2021    HGB 13.0 03/02/2021    HCT 43.6 03/02/2021    MCV 98.4 03/02/2021     03/02/2021     Lab Results   Component Value Date     03/01/2021    K 5.0 03/01/2021     03/01/2021    CO2 18 (L) 03/01/2021    BUN 24 (H) 03/01/2021    CREATININE 1.2 (H) 03/01/2021    GLUCOSE 110 (H) 03/01/2021    CALCIUM 9.3 03/01/2021    PROT 7.1 03/01/2021    LABALBU 4.7 03/01/2021    BILITOT 0.4 03/01/2021    ALKPHOS 62 03/01/2021    AST 35 03/01/2021    ALT 24 03/01/2021    LABGLOM 44 (L) 03/01/2021    GFRAA 53 (L) 03/01/2021     Lab Results   Component Value Date    CHOL 256 (H) 03/01/2021     Lab Results   Component Value Date    TRIG 93 03/01/2021     Lab Results   Component Value Date    HDL 87 03/01/2021     No results found for: [address], LDLCHOLESTEROL  Lab Results   Component Value Date    LABA1C 5.8 03/01/2021     Lab Results   Component Value Date    TSH 2.900 04/24/2020    TSHHS 0.039 (L) 03/01/2021 BP (!) 140/85   Pulse 99   Wt 114 lb 12.8 oz (52.1 kg)   SpO2 96%   BMI 21.69 kg/m²     BP Readings from Last 3 Encounters:   07/06/21 (!) 140/85   04/01/21 130/68   04/01/21 130/68       Wt Readings from Last 3 Encounters:   07/06/21 114 lb 12.8 oz (52.1 kg)   04/01/21 113 lb 9.6 oz (51.5 kg)   04/01/21 113 lb 9.6 oz (51.5 kg)         Physical Exam  Constitutional:       General: She is not in acute distress. Appearance: Normal appearance. She is well-developed. She is not ill-appearing or diaphoretic. HENT:      Head: Normocephalic and atraumatic. Cardiovascular:      Rate and Rhythm: Normal rate and regular rhythm. Heart sounds: Normal heart sounds. No murmur heard. Pulmonary:      Effort: Pulmonary effort is normal.      Breath sounds: Normal breath sounds. No wheezing. Abdominal:      Comments: Colostomy bag in place   Musculoskeletal:         General: Normal range of motion. Cervical back: Normal range of motion and neck supple. No rigidity. Right lower leg: No edema. Left lower leg: No edema. Neurological:      General: No focal deficit present. Mental Status: She is alert and oriented to person, place, and time. Psychiatric:         Mood and Affect: Mood normal.         Behavior: Behavior normal.         ASSESSMENT/ PLAN:    1. Essential hypertension  - stable    2. Acquired hypothyroidism  - stable    3. Osteoporosis without current pathological fracture, unspecified osteoporosis type  - stable    4. Major depressive disorder with single episode, in full remission ([facility] [address]  - stable    5. Hyperlipidemia, unspecified hyperlipidemia type  - stable            - All old blood work reviewed with the patient  - Appropriate prescription are addressed. - After visit summery provided. - Questions answered and patient verbalizes understanding.  - Call for any problem, questions, or concerns. Return in about 3 months (around 10/6/2021).

## 2021-07-07 ASSESSMENT — ENCOUNTER SYMPTOMS
SHORTNESS OF BREATH: 1
COUGH: 0

## 2021-07-08 ENCOUNTER — TELEPHONE (OUTPATIENT)
Dept: FAMILY MEDICINE CLINIC | Age: 76
End: 2021-07-08

## 2021-07-08 NOTE — TELEPHONE ENCOUNTER
Patient called and stated that she was seen this week and you had noticed a wheeze in the patient and told her if she started to feel bad to give us a call. The patient called and stated  that she is now not feeling well and would like to know if there is anything you could call in for her?  Please advise

## 2021-07-09 RX ORDER — DOXYCYCLINE HYCLATE 100 MG
100 TABLET ORAL 2 TIMES DAILY
Qty: 20 TABLET | Refills: 0 | Status: SHIPPED | OUTPATIENT
Start: 2021-07-09 | End: 2021-07-19

## 2021-09-13 DIAGNOSIS — E78.5 HYPERLIPIDEMIA, UNSPECIFIED HYPERLIPIDEMIA TYPE: ICD-10-CM

## 2021-09-13 RX ORDER — ATORVASTATIN CALCIUM 10 MG/1
TABLET, FILM COATED ORAL
Qty: 90 TABLET | Refills: 0 | Status: SHIPPED | OUTPATIENT
Start: 2021-09-13 | End: 2021-12-09

## 2021-10-06 ENCOUNTER — TELEPHONE (OUTPATIENT)
Dept: SURGERY | Age: 76
End: 2021-10-06

## 2021-10-06 DIAGNOSIS — M79.604 RIGHT LEG PAIN: ICD-10-CM

## 2021-10-06 DIAGNOSIS — Z12.31 ENCOUNTER FOR SCREENING MAMMOGRAM FOR MALIGNANT NEOPLASM OF BREAST: Primary | ICD-10-CM

## 2021-10-12 ENCOUNTER — OFFICE VISIT (OUTPATIENT)
Dept: FAMILY MEDICINE CLINIC | Age: 76
End: 2021-10-12
Payer: MEDICARE

## 2021-10-12 VITALS
BODY MASS INDEX: 21.43 KG/M2 | HEART RATE: 75 BPM | SYSTOLIC BLOOD PRESSURE: 160 MMHG | OXYGEN SATURATION: 99 % | DIASTOLIC BLOOD PRESSURE: 82 MMHG | WEIGHT: 113.4 LBS

## 2021-10-12 DIAGNOSIS — F32.5 MAJOR DEPRESSIVE DISORDER WITH SINGLE EPISODE, IN FULL REMISSION (HCC): ICD-10-CM

## 2021-10-12 DIAGNOSIS — M81.0 OSTEOPOROSIS WITHOUT CURRENT PATHOLOGICAL FRACTURE, UNSPECIFIED OSTEOPOROSIS TYPE: ICD-10-CM

## 2021-10-12 DIAGNOSIS — I10 ESSENTIAL HYPERTENSION: Primary | ICD-10-CM

## 2021-10-12 DIAGNOSIS — Z90.49 H/O TOTAL COLECTOMY: ICD-10-CM

## 2021-10-12 DIAGNOSIS — E03.9 ACQUIRED HYPOTHYROIDISM: ICD-10-CM

## 2021-10-12 DIAGNOSIS — E78.5 HYPERLIPIDEMIA, UNSPECIFIED HYPERLIPIDEMIA TYPE: ICD-10-CM

## 2021-10-12 PROCEDURE — 99214 OFFICE O/P EST MOD 30 MIN: CPT | Performed by: FAMILY MEDICINE

## 2021-10-12 ASSESSMENT — ENCOUNTER SYMPTOMS
COUGH: 0
SHORTNESS OF BREATH: 0
ABDOMINAL PAIN: 0

## 2021-10-12 NOTE — PROGRESS NOTES
Alcohol use: Not Currently     Comment: average 3-4 times per year    Drug use: No    Sexual activity: Not on file   Other Topics Concern    Not on file   Social History Narrative    Not on file     Social Determinants of Health     Financial Resource Strain:     Difficulty of Paying Living Expenses:    Food Insecurity:     Worried About Running Out of Food in the Last Year:     [address] in the Last Year:    Transportation Needs:     Lack of Transportation (Medical):  Lack of Transportation (Non-Medical):    Physical Activity:     Days of Exercise per Week:     Minutes of Exercise per Session:    Stress:     Feeling of Stress :    Social Connections:     Frequency of Communication with Friends and Family:     Frequency of Social Gatherings with Friends and Family:     Attends Restoration Services:     Active Member of Clubs or Organizations:     Attends Club or Organization Meetings:     Marital Status:    Intimate Partner Violence:     Fear of Current or Ex-Partner:     Emotionally Abused:     Physically Abused:     Sexually Abused:         Allergies   Allergen Reactions    Codeine Nausea And Vomiting    Biaxin [Clarithromycin] Nausea Only    Demerol Hcl [Meperidine]      \"room spins\"  \"room spins\"    Sulfa Antibiotics Rash     Current Outpatient Medications   Medication Sig Dispense Refill    atorvastatin (LIPITOR) 10 MG tablet TAKE 1 TABLET BY MOUTH EVERY DAY 90 tablet 0    potassium chloride (KLOR-CON M) 20 MEQ extended release tablet Take 1 tablet by mouth daily 90 tablet 1    anastrozole (ARIMIDEX) 1 MG tablet TAKE ONE TABLET BY MOUTH ONCE DAILY 90 tablet 3    alendronate (FOSAMAX) 70 MG tablet Take 1 tablet by mouth every 7 days 12 tablet 3    spironolactone (ALDACTONE) 25 MG tablet Take 1 tablet by mouth daily 90 tablet 3    metoprolol succinate (TOPROL XL) 100 MG extended release tablet Take 1 tablet by mouth daily 90 tablet 3    amLODIPine (NORVASC) 10 MG tablet Take 1 tablet by mouth daily 90 tablet 3    venlafaxine (EFFEXOR XR) 37.5 MG extended release capsule Take 1 capsule by mouth daily 90 capsule 3    Levothyroxine Sodium 88 MCG CAPS Take 88 mcg by mouth Daily       calcium-vitamin D (OSCAL-500) 500-200 MG-UNIT per tablet Take 1 tablet by mouth daily.  albuterol sulfate HFA (PROVENTIL HFA) 108 (90 Base) MCG/ACT inhaler Inhale 2 puffs into the lungs       No current facility-administered medications for this visit. Review of Systems   Constitutional: Negative for activity change, appetite change, chills, fatigue and fever. HENT: Negative for congestion. Respiratory: Negative for cough and shortness of breath. Cardiovascular: Negative for chest pain and leg swelling. Gastrointestinal: Negative for abdominal pain. Genitourinary: Negative for frequency. Skin: Negative for rash. Neurological: Negative for dizziness and headaches. Psychiatric/Behavioral: Negative for agitation, behavioral problems and sleep disturbance. The patient is not nervous/anxious.         Lab Results   Component Value Date    WBC 6.2 03/02/2021    HGB 13.0 03/02/2021    HCT 43.6 03/02/2021    MCV 98.4 03/02/2021     03/02/2021     Lab Results   Component Value Date     03/01/2021    K 5.0 03/01/2021     03/01/2021    CO2 18 (L) 03/01/2021    BUN 24 (H) 03/01/2021    CREATININE 1.2 (H) 03/01/2021    GLUCOSE 110 (H) 03/01/2021    CALCIUM 9.3 03/01/2021    PROT 7.1 03/01/2021    LABALBU 4.7 03/01/2021    BILITOT 0.4 03/01/2021    ALKPHOS 62 03/01/2021    AST 35 03/01/2021    ALT 24 03/01/2021    LABGLOM 44 (L) 03/01/2021    GFRAA 53 (L) 03/01/2021     Lab Results   Component Value Date    CHOL 256 (H) 03/01/2021     Lab Results   Component Value Date    TRIG 93 03/01/2021     Lab Results   Component Value Date    HDL 87 03/01/2021     No results found for: [address], LDLCHOLESTEROL  Lab Results   Component Value Date    LABA1C 5.8 03/01/2021     Lab Results Component Value Date    TSH 1.510 09/07/2021    TSHHS 0.039 (L) 03/01/2021         BP (!) 160/82   Pulse 75   Wt 113 lb 6.4 oz (51.4 kg)   SpO2 99%   BMI 21.43 kg/m²     BP Readings from Last 3 Encounters:   10/12/21 (!) 160/82   07/06/21 (!) 140/85   04/01/21 130/68       Wt Readings from Last 3 Encounters:   10/12/21 113 lb 6.4 oz (51.4 kg)   07/06/21 114 lb 12.8 oz (52.1 kg)   04/01/21 113 lb 9.6 oz (51.5 kg)         Physical Exam  Constitutional:       General: She is not in acute distress. Appearance: Normal appearance. She is well-developed. She is not ill-appearing or diaphoretic. HENT:      Head: Normocephalic and atraumatic. Eyes:      General: No scleral icterus. Pupils: Pupils are equal, round, and reactive to light. Cardiovascular:      Rate and Rhythm: Normal rate and regular rhythm. Heart sounds: Normal heart sounds. No murmur heard. Pulmonary:      Effort: Pulmonary effort is normal.      Breath sounds: Normal breath sounds. No wheezing. Musculoskeletal:         General: Normal range of motion. Cervical back: Normal range of motion and neck supple. No rigidity. Right lower leg: No edema. Left lower leg: No edema. Neurological:      General: No focal deficit present. Mental Status: She is alert and oriented to person, place, and time. Psychiatric:         Mood and Affect: Mood normal.         Behavior: Behavior normal.         ASSESSMENT/ PLAN:    1. Essential hypertension  -little high today, she took her medication, but today was upset with her sister  No change in her medication, stats usually has normal reading, and has no symptoms. 2. Hyperlipidemia, unspecified hyperlipidemia type  - stable    3. Acquired hypothyroidism  - stable, f//u with the endocrinology    4. Osteoporosis without current pathological fracture, unspecified osteoporosis type  - stable    5.  Major [facility] with single episode[facility]  - stable    6. H/O total colectomy  - stable              - All old blood work reviewed with the patient  - Appropriate prescription are addressed. - After visit summery provided. - Questions answered and patient verbalizes understanding.  - Call for any problem, questions, or concerns. Return in about 3 months (around 1/12/2022).

## 2021-11-29 ENCOUNTER — TELEPHONE (OUTPATIENT)
Dept: FAMILY MEDICINE CLINIC | Age: 76
End: 2021-11-29

## 2021-12-09 DIAGNOSIS — E87.6 HYPOKALEMIA: ICD-10-CM

## 2021-12-09 RX ORDER — POTASSIUM CHLORIDE 1500 MG/1
TABLET, EXTENDED RELEASE ORAL
Qty: 90 TABLET | Refills: 1 | Status: SHIPPED | OUTPATIENT
Start: 2021-12-09 | End: 2022-06-30

## 2021-12-13 ENCOUNTER — HOSPITAL ENCOUNTER (OUTPATIENT)
Dept: WOMENS IMAGING | Age: 76
Discharge: HOME | End: 2021-12-13
Payer: MEDICARE

## 2021-12-13 DIAGNOSIS — Z12.31 ENCOUNTER FOR SCREENING MAMMOGRAM FOR MALIGNANT NEOPLASM OF BREAST: ICD-10-CM

## 2021-12-13 PROCEDURE — 77063 BREAST TOMOSYNTHESIS BI: CPT

## 2021-12-14 ENCOUNTER — OFFICE VISIT (OUTPATIENT)
Dept: SURGERY | Age: 76
End: 2021-12-14
Payer: MEDICARE

## 2021-12-14 VITALS
WEIGHT: 115.8 LBS | BODY MASS INDEX: 21.88 KG/M2 | TEMPERATURE: 96.9 F | SYSTOLIC BLOOD PRESSURE: 135 MMHG | DIASTOLIC BLOOD PRESSURE: 83 MMHG | RESPIRATION RATE: 22 BRPM | HEART RATE: 83 BPM

## 2021-12-14 DIAGNOSIS — D05.12 DUCTAL CARCINOMA IN SITU (DCIS) OF LEFT BREAST: Primary | ICD-10-CM

## 2021-12-14 PROCEDURE — 99213 OFFICE O/P EST LOW 20 MIN: CPT | Performed by: SURGERY

## 2021-12-14 NOTE — PROGRESS NOTES
Shireen Mgaana is here for cancer of the left  Breast.  Initial size 2 cm. Nodes resected 0. Numbers positive: 0. Stage: TIS N0 M0. (Stage: IS)   Procedure: lumpectomy  Date: 10/13/2014   Hormonal Therapy:yes, still taking  Chemotherapy: no  Radiation Therapy: done  [facility] no  ONCOTYPE no  ER+/NH+/HER2+    Current Evaluation:   Today the patient is basically feeling well. She denies new breast mass, nipple discharge, or breast pain. No new subcutaneous mass, headache, bone pain, new cough, or abdominal pain. Physical Exam:   Generally healthy appearing,  female   Skin: no new subcutaneous mass   Breast: non-tender, no new mass   Lungs clear   Heart RRR  Lymphadenopathy: no new axillary and supraclavicular   Abdomen: non-tender without liver, spleen, kidney, or mass palpable     Mammography:   Last mammogram: 12/13/2021   Mammogram is unchanged from previous mammograms.  Radiologist report is negative for malignancy     Impression:   No evidence of systemic or recurrent breast cancer:   No evidence of second primary breast cancer     Plan: RTC 1 year  Will need a mammogram in Dec 2022

## 2022-01-11 LAB
ANION GAP 4: 12 MMOL/L (ref 7–16)
BUN BLDV-MCNC: 26 MG/DL (ref 7–25)
CALCIUM SERPL-MCNC: 10 MG/DL (ref 8.6–10.2)
CHLORIDE BLD-SCNC: 108 MMOL/L (ref 98–107)
CO2: 21 MMOL/L (ref 21–31)
CREATININE + EGFR PANEL: 1.4 MG/DL (ref 0.6–1.2)
GFR CALCULATED: 37 ML/MIN/1.73M2
GFR CALCULATED: 44 ML/MIN/1.73M2
GLUCOSE: 97 MG/DL (ref 74–109)
HEMOGLOBIN URINE, QUAL: 12.9 G/DL (ref 12.1–15.8)
MCH RBC QN AUTO: 30.4 PG (ref 28.4–33.4)
MCHC RBC AUTO-ENTMCNC: 33.3 G/DL (ref 31.1–37)
MCV RBC AUTO: 91.2 FL (ref 85–99)
PDW BLD-RTO: 14.6 % (ref 11.7–15.2)
PLATELET COUNT MANUAL: 282 K/UL (ref 154–393)
POTASSIUM SERPL-SCNC: 4.7 MMOL/L (ref 3.5–5.3)
RBC # BLD: 4.24 M/UL (ref 3.86–5.17)
SARS-COV-2: NORMAL
SODIUM BLD-SCNC: 141 MMOL/L (ref 136–145)
SR HEMATOCRIT: 38.7 % (ref 35.8–46.5)
WBC BLOOD, MANUAL: 6.4 K/UL (ref 4–10.5)

## 2022-01-17 LAB
LAB AP CASE REPORT: NORMAL
LAB AP CLINICAL DIAGNOSIS: NORMAL
Lab: NORMAL
PATHOLOGY REPORT FINAL DIAGNOSIS: NORMAL

## 2022-02-09 ENCOUNTER — OFFICE VISIT (OUTPATIENT)
Dept: FAMILY MEDICINE CLINIC | Age: 77
End: 2022-02-09
Payer: MEDICARE

## 2022-02-09 VITALS
DIASTOLIC BLOOD PRESSURE: 84 MMHG | BODY MASS INDEX: 21.43 KG/M2 | WEIGHT: 113.4 LBS | OXYGEN SATURATION: 99 % | SYSTOLIC BLOOD PRESSURE: 140 MMHG | HEART RATE: 71 BPM

## 2022-02-09 DIAGNOSIS — F32.5 MAJOR DEPRESSIVE DISORDER WITH SINGLE EPISODE, IN FULL REMISSION (HCC): ICD-10-CM

## 2022-02-09 DIAGNOSIS — E78.5 HYPERLIPIDEMIA, UNSPECIFIED HYPERLIPIDEMIA TYPE: ICD-10-CM

## 2022-02-09 DIAGNOSIS — E87.6 HYPOKALEMIA: ICD-10-CM

## 2022-02-09 DIAGNOSIS — E03.9 ACQUIRED HYPOTHYROIDISM: ICD-10-CM

## 2022-02-09 DIAGNOSIS — M81.0 OSTEOPOROSIS WITHOUT CURRENT PATHOLOGICAL FRACTURE, UNSPECIFIED OSTEOPOROSIS TYPE: ICD-10-CM

## 2022-02-09 DIAGNOSIS — I10 ESSENTIAL HYPERTENSION: Primary | ICD-10-CM

## 2022-02-09 LAB
CHOLESTEROL, TOTAL: 209 MG/DL (ref 0–199)
HDLC SERPL-MCNC: 92 MG/DL (ref 40–60)
LDL CHOLESTEROL CALCULATED: 102 MG/DL
TRIGL SERPL-MCNC: 73 MG/DL (ref 0–150)
VLDLC SERPL CALC-MCNC: 15 MG/DL

## 2022-02-09 PROCEDURE — 99214 OFFICE O/P EST MOD 30 MIN: CPT | Performed by: FAMILY MEDICINE

## 2022-02-09 RX ORDER — METOPROLOL SUCCINATE 100 MG/1
100 TABLET, EXTENDED RELEASE ORAL DAILY
Qty: 90 TABLET | Refills: 3 | Status: SHIPPED | OUTPATIENT
Start: 2022-02-09

## 2022-02-09 RX ORDER — SPIRONOLACTONE 25 MG/1
25 TABLET ORAL DAILY
Qty: 90 TABLET | Refills: 3 | Status: SHIPPED | OUTPATIENT
Start: 2022-02-09

## 2022-02-09 RX ORDER — VENLAFAXINE HYDROCHLORIDE 37.5 MG/1
37.5 CAPSULE, EXTENDED RELEASE ORAL DAILY
Qty: 90 CAPSULE | Refills: 3 | Status: SHIPPED | OUTPATIENT
Start: 2022-02-09

## 2022-02-09 RX ORDER — AMLODIPINE BESYLATE 10 MG/1
10 TABLET ORAL DAILY
Qty: 90 TABLET | Refills: 3 | Status: SHIPPED | OUTPATIENT
Start: 2022-02-09

## 2022-02-09 ASSESSMENT — ENCOUNTER SYMPTOMS
ABDOMINAL PAIN: 0
COUGH: 0
SHORTNESS OF BREATH: 0

## 2022-02-09 NOTE — PROGRESS NOTES
Reynold Huang  1945 02/09/22    Chief Complaint   Patient presents with    Hypertension    Hyperlipidemia    Hypothyroidism    Depression    Osteoporosis    Medication Refill           Patient here for 3 months f/u regarding HTN, HLD, hypothyroidism, s/p thyroid cancer, depression, and osteoporosis. The patient is taking hypertensive medications compliantly without side effects. Denies chest pain, dyspnea, edema, or TIA's. She dose f/u with Dr Stevie Kidd for her hypothyroidism. 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 Dalton Baez at [facility] History of therapeutic radiation     Hx antineoplastic chemo     Hypertension     Ileostomy care ([facility] [address]     Nausea & vomiting     \"got sick with one surgery\"    Thyroid ca [facility])     had thyroid cancer in 2005- tx with radioactive iodine and radiation    Thyroid disease     hypo    Ulcerative colitis ([facility] [address]      Past Surgical History:   Procedure Laterality Date    APPENDECTOMY      BREAST LUMPECTOMY Left 10/13/14    left lumpectomy with needle localization    BREAST SURGERY      2001 lumpectomy right breast    ILEOSTOMY OR JEJUNOSTOMY Right 1966    colon surgery\"removed most of the large and part of small intestine\"'removed appendix at that time    JOINT REPLACEMENT Right     hip   [address]    \"removed a tumor from the left lung\"   [address]  as a child     Family History   Problem Relation Age of Onset    High Blood Pressure Mother     Cancer Father         liver cancer    Breast Cancer Maternal Aunt      Social History     Socioeconomic History    Marital status:       Spouse name: Not on file    Number of children: Not on file    Years of education: Not on file    Highest education level: Not on file   Occupational History    Not on file   Tobacco Use    Smoking status: Never Smoker    Smokeless tobacco: Never Used   Vaping Use    Vaping Use: Never used   Substance and Sexual Activity    Alcohol use: Not Currently     Comment: average 3-4 times per year    Drug use: No    Sexual activity: Not on file   Other Topics Concern    Not on file   Social History Narrative    Not on file     Social Determinants of Health     Financial Resource Strain:     Difficulty of Paying Living Expenses: Not on file   Food Insecurity:     Worried About Running Out of Food in the Last Year: Not on file    Jose [employer] of Food in the Last Year: Not on file   Transportation Needs:     Lack of Transportation (Medical): Not on file    Lack of Transportation (Non-Medical):  Not on file   Physical Activity:     Days of Exercise per Week: Not on file    Minutes of Exercise per Session: Not on file   Stress:     Feeling of Stress : Not on file   Social Connections:     Frequency of Communication with Friends and Family: Not on file    Frequency of Social Gatherings with Friends and Family: Not on file    Attends Tenriism Services: Not on file    Active Member of [address] [place] [address] or Organizations: Not on file    Attends Club or Organization Meetings: Not on file    Marital Status: Not on file   Intimate Partner Violence:     Fear of Current or Ex-Partner: Not on file    Emotionally Abused: Not on file    Physically Abused: Not on file    Sexually Abused: Not on file   Housing Stability:     Unable to Pay for Housing in the Last Year: Not on file    Number of [place] in the Last Year: Not on file    Unstable Housing in the Last Year: Not on file       Allergies   Allergen Reactions    Codeine Nausea And Vomiting    Biaxin [Clarithromycin] Nausea Only    Demerol Hcl [Meperidine]      \"room spins\"  \"room spins\"    Sulfa Antibiotics Rash     Current Outpatient Medications   Medication Sig Dispense Refill  spironolactone (ALDACTONE) 25 MG tablet Take 1 tablet by mouth daily 90 tablet 3    venlafaxine (EFFEXOR XR) 37.5 MG extended release capsule Take 1 capsule by mouth daily 90 capsule 3    metoprolol succinate (TOPROL XL) 100 MG extended release tablet Take 1 tablet by mouth daily 90 tablet 3    amLODIPine (NORVASC) 10 MG tablet Take 1 tablet by mouth daily 90 tablet 3    atorvastatin (LIPITOR) 10 MG tablet TAKE 1 TABLET DAILY 90 tablet 3    KLOR-CON M20 20 MEQ extended release tablet TAKE 1 TABLET DAILY 90 tablet 1    anastrozole (ARIMIDEX) 1 MG tablet TAKE ONE TABLET BY MOUTH ONCE DAILY 90 tablet 3    alendronate (FOSAMAX) 70 MG tablet Take 1 tablet by mouth every 7 days 12 tablet 3    Levothyroxine Sodium 88 MCG CAPS Take 88 mcg by mouth Daily       calcium-vitamin D (OSCAL-500) 500-200 MG-UNIT per tablet Take 1 tablet by mouth daily.  albuterol sulfate HFA (PROVENTIL HFA) 108 (90 Base) MCG/ACT inhaler Inhale 2 puffs into the lungs       No current facility-administered medications for this visit. Review of Systems   Constitutional: Negative for activity change, appetite change, chills, fatigue and fever. HENT: Negative for congestion. Respiratory: Negative for cough and shortness of breath. Cardiovascular: Negative for chest pain and leg swelling. Gastrointestinal: Negative for abdominal pain. Genitourinary: Negative for frequency. Skin: Negative for rash. Neurological: Negative for dizziness and headaches. Psychiatric/Behavioral: Negative for agitation, behavioral problems and sleep disturbance. The patient is not nervous/anxious.         Lab Results   Component Value Date    WBC 6.2 03/02/2021    HGB 13.0 03/02/2021    HCT 43.6 03/02/2021    MCV 91.2 01/11/2022     03/02/2021     Lab Results   Component Value Date     01/11/2022    K 4.7 01/11/2022     (A) 01/11/2022    CO2 21 01/11/2022    BUN 26 (A) 01/11/2022    CREATININE 1.2 (H) 03/01/2021 person, place, and time. Psychiatric:         Mood and Affect: Mood normal.         Behavior: Behavior normal.         ASSESSMENT/ PLAN:    1. Essential hypertension  -Stable, she is taking spironolactone and metoprolol and amlodipine, continue same medication and no changes  - spironolactone (ALDACTONE) 25 MG tablet; Take 1 tablet by mouth daily  Dispense: 90 tablet; Refill: 3  - metoprolol succinate (TOPROL XL) 100 MG extended release tablet; Take 1 tablet by mouth daily  Dispense: 90 tablet; Refill: 3  - amLODIPine (NORVASC) 10 MG tablet; Take 1 tablet by mouth daily  Dispense: 90 tablet; Refill: 3    2. Hyperlipidemia, unspecified hyperlipidemia type  -She is on Lipitor and we will recheck her cholesterol today  - Lipid Panel; Future    3. Osteoporosis without current pathological fracture, unspecified osteoporosis type  -Stable she is taking Fosamax    4. Major depressive disorder with single episode, in full remission ([facility] [address]  -Under control with the medication  - venlafaxine (EFFEXOR XR) 37.5 MG extended release capsule; Take 1 capsule by mouth daily  Dispense: 90 capsule; Refill: 3    5. Hypokalemia  Her potassium level under control, last potassium checked was 4.7 in January 22  - spironolactone (ALDACTONE) 25 MG tablet; Take 1 tablet by mouth daily  Dispense: 90 tablet; Refill: 3    6. hypothyroidism  -Does follow-up with Dr. Renaldo Montero, past TSH under control done on September 21 was 1.5          - All old blood work reviewed with the patient  - Appropriate prescription are addressed. - After visit summery provided. - Questions answered and patient verbalizes understanding.  - Call for any problem, questions, or concerns. Return in about 6 months (around 8/9/2022) for medicare wellness.

## 2022-02-21 ENCOUNTER — CARE COORDINATION (OUTPATIENT)
Dept: CARE COORDINATION | Age: 77
End: 2022-02-21

## 2022-02-21 SDOH — SOCIAL STABILITY: SOCIAL NETWORK
DO YOU BELONG TO ANY CLUBS OR ORGANIZATIONS SUCH AS CHURCH GROUPS UNIONS, FRATERNAL OR ATHLETIC GROUPS, OR SCHOOL GROUPS?: NO

## 2022-02-21 SDOH — SOCIAL STABILITY: SOCIAL NETWORK: ARE YOU MARRIED, WIDOWED, DIVORCED, SEPARATED, NEVER MARRIED, OR LIVING WITH A PARTNER?: WIDOWED

## 2022-02-21 SDOH — SOCIAL STABILITY: SOCIAL NETWORK: HOW OFTEN DO YOU GET TOGETHER WITH FRIENDS OR RELATIVES?: ONCE A WEEK

## 2022-02-21 SDOH — HEALTH STABILITY: PHYSICAL HEALTH: ON AVERAGE, HOW MANY DAYS PER WEEK DO YOU ENGAGE IN MODERATE TO STRENUOUS EXERCISE (LIKE A BRISK WALK)?: 3 DAYS

## 2022-02-21 SDOH — ECONOMIC STABILITY: HOUSING INSECURITY: IN THE LAST 12 MONTHS, HOW MANY PLACES HAVE YOU LIVED?: 1

## 2022-02-21 SDOH — ECONOMIC STABILITY: FOOD INSECURITY: WITHIN THE PAST 12 MONTHS, YOU WORRIED THAT YOUR FOOD WOULD RUN OUT BEFORE YOU GOT MONEY TO BUY MORE.: NEVER TRUE

## 2022-02-21 SDOH — SOCIAL STABILITY: SOCIAL NETWORK: IN A TYPICAL WEEK, HOW MANY TIMES DO YOU TALK ON THE PHONE WITH FAMILY, FRIENDS, OR NEIGHBORS?: ONCE A WEEK

## 2022-02-21 SDOH — ECONOMIC STABILITY: HOUSING INSECURITY
IN THE LAST 12 MONTHS, WAS THERE A TIME WHEN YOU DID NOT HAVE A STEADY PLACE TO SLEEP OR SLEPT IN A SHELTER (INCLUDING NOW)?: NO

## 2022-02-21 SDOH — HEALTH STABILITY: MENTAL HEALTH: HOW OFTEN DO YOU HAVE A DRINK CONTAINING ALCOHOL?: NEVER

## 2022-02-21 SDOH — SOCIAL STABILITY: SOCIAL NETWORK: HOW OFTEN DO YOU ATTENT MEETINGS OF THE CLUB OR ORGANIZATION YOU BELONG TO?: NEVER

## 2022-02-21 SDOH — SOCIAL STABILITY: SOCIAL NETWORK: HOW OFTEN DO YOU ATTEND CHURCH OR RELIGIOUS SERVICES?: NEVER

## 2022-02-21 SDOH — ECONOMIC STABILITY: FOOD INSECURITY: WITHIN THE PAST 12 MONTHS, THE FOOD YOU BOUGHT JUST DIDN'T LAST AND YOU DIDN'T HAVE MONEY TO GET MORE.: NEVER TRUE

## 2022-02-21 SDOH — HEALTH STABILITY: PHYSICAL HEALTH: ON AVERAGE, HOW MANY MINUTES DO YOU ENGAGE IN EXERCISE AT THIS LEVEL?: 10 MIN

## 2022-02-21 SDOH — ECONOMIC STABILITY: TRANSPORTATION INSECURITY
IN THE PAST 12 MONTHS, HAS THE LACK OF TRANSPORTATION KEPT YOU FROM MEDICAL APPOINTMENTS OR FROM GETTING MEDICATIONS?: NO

## 2022-02-21 SDOH — ECONOMIC STABILITY: INCOME INSECURITY: IN THE LAST 12 MONTHS, WAS THERE A TIME WHEN YOU WERE NOT ABLE TO PAY THE MORTGAGE OR RENT ON TIME?: NO

## 2022-02-21 SDOH — ECONOMIC STABILITY: INCOME INSECURITY: HOW HARD IS IT FOR YOU TO PAY FOR THE VERY BASICS LIKE FOOD, HOUSING, MEDICAL CARE, AND HEATING?: NOT VERY HARD

## 2022-02-21 SDOH — ECONOMIC STABILITY: TRANSPORTATION INSECURITY
IN THE PAST 12 MONTHS, HAS LACK OF TRANSPORTATION KEPT YOU FROM MEETINGS, WORK, OR FROM GETTING THINGS NEEDED FOR DAILY LIVING?: NO

## 2022-02-21 SDOH — HEALTH STABILITY: MENTAL HEALTH
STRESS IS WHEN SOMEONE FEELS TENSE, NERVOUS, ANXIOUS, OR CAN'T SLEEP AT NIGHT BECAUSE THEIR MIND IS TROUBLED. HOW STRESSED ARE YOU?: ONLY A LITTLE

## 2022-02-21 NOTE — CARE COORDINATION
Ambulatory Care Coordination Note  2022  CM Risk Score: 0  Charlson 10 Year Mortality Risk Score: 23%     ACC: Vesta Avina, RN    Summary Note:   Spoke with patient for ACM follow up; [facility] eligible for enrollment. Oriented to the role of ACM. Identified patient by name and . Medications:  Medication reconciliation completed. Patient reports that she is taking her medications as directed. Confirmed plan to follow with Pulmonology to renew   Patient reports that she uses a mail order Pharmacy and manages her own medications. Denies the need for pre-packaged medications. Reports no issue with the cost of her medications. Patient reports hx. Of thyroid Cancer; breast CA. Reports that she completed treatment and is feeling well. Patient has had colostomy d/t ulcerative colitis. Reports that she has has colostomy for 50 years. Reports B& B status is WNL. Patient reports appetite is good. Reports that she is having a hard time keeping her weight on. Instructed on dietary supplements to comply with diet as directed. Discussed referral to RD for instruction to support protein intake. Instructed on the recommendation for routine Provider follow up. Confirmed PCP follow up 22; plan for Pulmonology follow up next week. Patient denies any transportation barriers. Denies any questions, equipment or resource needs at this time. ACM contact information provided should needs arise. Plan for next outreach:  Confirm RD follow up; Pulm. Appt. Discuss care gaps. RD referral made.        Ambulatory Care Coordination Assessment    Care Coordination Protocol  Program Enrollment: Complex Care  Referral from Primary Care Provider: No  Week 1 - Initial Assessment     Do you have all of your prescriptions and are they filled?: Yes  Barriers to medication adherence: None  Are you able to afford your medications?: Yes  How often do you have trouble taking your medications the way you have been told to take them?: I always take them as prescribed. Do you have Home O2 Therapy?: No      Ability to seek help/take action for Emergent Urgent situations i.e. fire, crime, inclement weather or health crisis. : Independent  Ability to ambulate to restroom: Independent  Ability handle personal hygeine needs (bathing/dressing/grooming): Independent  Ability to manage Medications: Independent  Ability to prepare Food Preparation: Independent  Ability to maintain home (clean home, laundry): Independent  Ability to drive and/or has transportation: Independent  Ability to do shopping: Independent  Ability to manage finances: Independent  Is patient able to live independently?: Yes     Current Housing: Private Residence        Per the Fall Risk Screening, did the patient have 2 or more falls or 1 fall with injury in the past year?: No     Frequent urination at night?: No  Do you use rails/bars?: Yes  Do you have a non-slip tub mat?: Yes     Are you experiencing loss of meaning?: No  Are you experiencing loss of hope and peace?: No     Thinking about your patient's physical health needs, are there any symptoms or problems (risk indicators) you are unsure about that require further investigation?: No identified areas of uncertainly or problems already being investigated   Are the patients physical health problems impacting on their mental well-being?: No identified areas of concern   Are there any problems with your patients lifestyle behaviors (alcohol, drugs, diet, exercise) that are impacting on physical or mental well-being?: No identified areas of concern   Do you have any other concerns about your patients mental well-being?  How would you rate their severity and impact on the patient?: No identified areas of concern   How would you rate their home environment in terms of safety and stability (including domestic violence, insecure housing, neighbor harassment)?: Consistently safe, supportive, stable, no identified problems   How do daily activities impact on the patient's well-being? (include current or anticipated unemployment, work, caregiving, access to transportation or other): No identified problems or perceived positive benefits   How would you rate their social network (family, work, friends)?: Good participation with social networks   How would you rate their financial resources (including ability to afford all required medical care)?: Financially secure, resources adequate, no identified problems   How wells does the patient now understand their health and well-being (symptoms, signs or risk factors) and what they need to do to manage their health?: Reasonable to good understanding and already engages in managing health or is willing to undertake better management   How well do you think your patient can engage in healthcare discussions? (Barriers include language, deafness, aphasia, alcohol or drug problems, learning difficulties, concentration): Clear and open communication, no identified barriers   Do other services need to be involved to help this patient?: Other care/services not required at this time   Are current services involved with this patient well-coordinated? (Include coordination with other services you are now recommendation): All required care/services in place and well-coordinated   Suggested Interventions and Community Resources  Fall Risk Prevention: In Process Registered Dietician: In Process         Set up/Review an Education Plan, Set up/Review Goals              Prior to Admission medications    Medication Sig Start Date End Date Taking?  Authorizing Provider   spironolactone (ALDACTONE) 25 MG tablet Take 1 tablet by mouth daily 2/9/22  Yes Scottie Allison MD   venlafaxine (EFFEXOR XR) 37.5 MG extended release capsule Take 1 capsule by mouth daily 2/9/22  Yes Scottie Allison MD   metoprolol succinate (TOPROL XL) 100 MG extended release tablet Take 1 tablet by mouth daily 2/9/22  Yes Timoteo Short MD   amLODIPine (NORVASC) 10 MG tablet Take 1 tablet by mouth daily 2/9/22  Yes Timoteo Short MD   atorvastatin (LIPITOR) 10 MG tablet TAKE 1 TABLET DAILY 12/9/21  Yes Timoteo Short MD   KLOR-CON M20 20 MEQ extended release tablet TAKE 1 TABLET DAILY 12/9/21  Yes Timoteo Short MD   anastrozole (ARIMIDEX) 1 MG tablet TAKE ONE TABLET BY MOUTH ONCE DAILY 4/13/21  Yes El Burns MD   alendronate (FOSAMAX) 70 MG tablet Take 1 tablet by mouth every 7 days 4/1/21  Yes Timoteo Short MD   albuterol sulfate HFA (PROVENTIL HFA) 108 (90 Base) MCG/ACT inhaler Inhale 2 puffs into the lungs 9/30/19 2/21/22 Yes Historical Provider, MD   Levothyroxine Sodium 88 MCG CAPS Take 88 mcg by mouth Daily    Yes Historical Provider, MD   calcium-vitamin D (OSCAL-500) 500-200 MG-UNIT per tablet Take 1 tablet by mouth daily.    Yes Historical Provider, MD       Future Appointments   Date Time Provider Martin Edge   8/10/2022  9:00 AM MD Gurwinder PhillipyCresAndrés [facility]   12/13/2022  9:15 AM Jillian Maciel APRN - CNP [address] [facility]      and   General Assessment    Do you have any symptoms that are causing concern?: No

## 2022-02-22 ENCOUNTER — CARE COORDINATION (OUTPATIENT)
Dept: CARE COORDINATION | Age: 77
End: 2022-02-22

## 2022-02-22 NOTE — CARE COORDINATION
Contacted Arianna Cai and left voicemail regarding Dietitian referral. Left call back number and will follow up as appropriate.          [address] [facility], [address] [place] [address]

## 2022-02-28 ENCOUNTER — CARE COORDINATION (OUTPATIENT)
Dept: CARE COORDINATION | Age: 77
End: 2022-02-28

## 2022-02-28 NOTE — CARE COORDINATION
Ambulatory Care Coordination Note  2/28/2022  CM Risk Score: 0  Charlson 10 Year Mortality Risk Score: 23%     ACC: Frida Cortez RN    Summary Note:   Spoke with patient briefly for ACM follow up. Patient reports that she is doing ok today. Appetite is the same, B& B status WNL for patient. Confirmed contact per RD. Encouraged patient to return call at her convenience. Further assessment deferred per patient as she reports that she is on her way to an appointment. ACM contact information confirmed. Encouraged return call should needs arise. Plan for next outreach; address care gaps, diet, ACP support        Care Coordination Interventions    Program Enrollment: Complex Care  Referral from Primary Care Provider: No  Suggested Interventions and Community Resources  Fall Risk Prevention: In Process  Registered Dietician: In Process         Goals Addressed                 This Visit's Progress     Wellness Goal   No change     Patient Self-Management Goal for Health Maintenance  Goal: I will follow a healthy diet as discussed by my provider. I will schedule a yearly preventative care visit. I will schedule screening colonoscopies as directed by my provider. I will schedule routine eye examinations with an eye specialist as directed by my provider. I will consider obtaining vaccines recommended by my provider. Barriers: lack of support and overwhelmed by complexity of regimen  Plan for overcoming my barriers :  Patient will follow through with routine Provider follow up as directed. Patient to update provider of any condition changes. Confidence: 8/10  Anticipated Goal Completion Date: 5/21/22            Prior to Admission medications    Medication Sig Start Date End Date Taking?  Authorizing Provider   spironolactone (ALDACTONE) 25 MG tablet Take 1 tablet by mouth daily 2/9/22   Buster Diamond MD   venlafaxine (EFFEXOR XR) 37.5 MG extended release capsule Take 1 capsule by mouth daily 2/9/22   Shawn Morel MD   metoprolol succinate (TOPROL XL) 100 MG extended release tablet Take 1 tablet by mouth daily 2/9/22   Shawn Day, MD   amLODIPine (NORVASC) 10 MG tablet Take 1 tablet by mouth daily 2/9/22   Shawn Day, MD   atorvastatin (LIPITOR) 10 MG tablet TAKE 1 TABLET DAILY 12/9/21   Shawn Day, MD SOLORIO-CON M20 20 MEQ extended release tablet TAKE 1 TABLET DAILY 12/9/21   Shawn Day, MD   anastrozole (ARIMIDEX) 1 MG tablet TAKE ONE TABLET BY MOUTH ONCE DAILY 4/13/21   Janna Gold MD   alendronate (FOSAMAX) 70 MG tablet Take 1 tablet by mouth every 7 days 4/1/21   Shawn Morel, MD   albuterol sulfate HFA (PROVENTIL HFA) 108 (90 Base) MCG/ACT inhaler Inhale 2 puffs into the lungs 9/30/19 2/21/22  Historical Provider, MD   Levothyroxine Sodium 88 MCG CAPS Take 88 mcg by mouth Daily     Historical Provider, MD   calcium-vitamin D (OSCAL-500) 500-200 MG-UNIT per tablet Take 1 tablet by mouth daily.     Historical Provider, MD       Future Appointments   Date Time Provider Martin Edge   8/10/2022  9:00 AM MD Rafia Bunch   12/13/2022  9:15 AM Yovana Alva APRN - CNP [address] [facility]      and   General Assessment    Do you have any symptoms that are causing concern?: No

## 2022-02-28 NOTE — CARE COORDINATION
[facility] regarding Dietitian referral. Pt answered, RD explained reason for call and role in care. Pt requested to call RD back later today. RD provided contact information and follow up as appropriate.        [address] [facility], [address] [place] [address]

## 2022-03-01 NOTE — CARE COORDINATION
Sandeep Sosa  March 1, 2022    Initial Referral Reason: protein calorie malnutrition secondary to breast CA/thyroid CA/ UC with colostomy     Patient Care Team:  Chema Álvarez MD as PCP - General (Family Medicine)  Chema Álvarez MD as PCP - [facility]  Asim Marx, RN as Ambulatory Care Manager  Dionisio Alvarenga RD, LD as Dietitian (Dietitian)    Past Medical History:    Current Outpatient Medications   Medication Sig Dispense Refill    spironolactone (ALDACTONE) 25 MG tablet Take 1 tablet by mouth daily 90 tablet 3    venlafaxine (EFFEXOR XR) 37.5 MG extended release capsule Take 1 capsule by mouth daily 90 capsule 3    metoprolol succinate (TOPROL XL) 100 MG extended release tablet Take 1 tablet by mouth daily 90 tablet 3    amLODIPine (NORVASC) 10 MG tablet Take 1 tablet by mouth daily 90 tablet 3    atorvastatin (LIPITOR) 10 MG tablet TAKE 1 TABLET DAILY 90 tablet 3    KLOR-CON M20 20 MEQ extended release tablet TAKE 1 TABLET DAILY 90 tablet 1    anastrozole (ARIMIDEX) 1 MG tablet TAKE ONE TABLET BY MOUTH ONCE DAILY 90 tablet 3    alendronate (FOSAMAX) 70 MG tablet Take 1 tablet by mouth every 7 days 12 tablet 3    albuterol sulfate HFA (PROVENTIL HFA) 108 (90 Base) MCG/ACT inhaler Inhale 2 puffs into the lungs      Levothyroxine Sodium 88 MCG CAPS Take 88 mcg by mouth Daily       calcium-vitamin D (OSCAL-500) 500-200 MG-UNIT per tablet Take 1 tablet by mouth daily. No current facility-administered medications for this visit.        Biochemical Data, Medical Tests and Procedures:    Lab Results   Component Value Date    LABA1C 5.8 03/01/2021     Lab Results   Component Value Date     03/01/2021       Lab Results   Component Value Date    CHOL 209 (H) 02/09/2022    CHOL 256 (H) 03/01/2021     Lab Results   Component Value Date    TRIG 73 02/09/2022    TRIG 93 03/01/2021     Lab Results   Component Value Date    HDL 92 (H) 02/09/2022    HDL 87 03/01/2021     Lab Results   Component Value Date    LDLCALC 102 (H) 02/09/2022     Lab Results   Component Value Date    LABVLDL 15 02/09/2022       Anthropometric Measurements:    Height: 61 inches (154.9 cm)  Weight: 113 lb (51.4 kg)  BMI: 21.43 (normal)  IBW: 105 lb (47.7 kg) +-10%  %IBW: 107.6%     Physical Exam Findings:  Deferred    Nutrition Interview: RD called pt, explained reason for call and role in care. Pt states appetite is \"good\", typically eats 3 meals/day. See food recall below. Per chart review, patient's weight history includes: 10/25/19 115 lb, 8/24/2020 112 lb, 10/12/21 113 lb , 12/14/21 115 lb, 2/9/22 113 lb. RD explained the importance of following a high protein, high calorie diet. Explained the importance of meeting estimated nutrition needs daily and incorporating more protein. Reviewed protein sources. Discussed adding a protein source to each meal- for example, for breakfast eat scrambled eggs or add peanut butter to toast instead of cream cheese. Recommended supplementing with Boost or Ensure. Patient states she used to drink Boost BID but has not been drinking it. RD discussed the importance of supplementing with Boost 1-2x/day to help better meet estimated nutrition needs. RD will mail Ensure coupons. No nutrition related questions at this time. RD offered to mail educational handouts to pt to reinforce concepts discussed during phone conversation, pt accepted and very appreciative. RD verified address.      24-Hour Diet Recall4  Breakfast  Consumed: toast with butter and cream cheese, coffee    Lunch  Consumed: scallop potatoes     Dinner  Consumed: hamburger with rice, green chilies, tomato sauce and carrots     Beverages: coffee, water, sweet tea and pop     Nutrition Diagnosis:  #1 Problem Inadequate energy intake       Etiology related to protein calorie malnutrition secondary to breast CA/thyroid CA/ UC with colostomy        Signs/Symptoms as evidenced by food recall and and need for nutrition supplement     Nutrition Intervention:     Estimated Needs  regular diet providing 1500 kcals to promote wt maintenance (30 kcal/kg based on CBW). Estimated daily Protein Needs: 51-62 g (based on 1.0-1.2 g/kg based on CBW)  Estimated daily Fluid Needs: 64 oz. #1 Nutrition Information: Provided patient with High Protein High Calorie Nutrition Therapy handouts. For reinforcement of concepts discussed during nutrition interview. #2 Nutrition Counseling: Used open-ended questions to assess patients perceived susceptibility and severity of disease state. Discussed potential impact of health threat on patient's lifestyle. Used open-ended questions to assess patient's perception regarding benefits of and barriers to implementation of nutrition therapy. #3 Nutrition Education: Clearly defined the benefits of nutrition therapy. Summarized and affirmed positive aspects of current nutrition patterns. Provided education regarding value of adherence to regular diet. Discussed ways to establish applying concepts of alternatives and choices regarding implementation of diet. Explored ideas for small, incremental goals to initiate behavior change. Monitoring and Evaluation:    Indicator/Goal Criteria   #1 Eat balanced meals consistently throughout the day. #1 Focus on eating 3 meals/day and incorporate a protein at each meal.    #2 Supplement with Ensure or Boost daily to help better meet estimated nutrition needs. #2 Start drinking Ensure or Boost at least once a day. Aim for BID. Follow Up: RD will call pt in 3 weeks to follow up and make sure pt received handouts in mail. RD will answer any nutrition related questions at this time.      [address] [facility], [facility] 14

## 2022-03-03 ENCOUNTER — TELEPHONE (OUTPATIENT)
Dept: FAMILY MEDICINE CLINIC | Age: 77
End: 2022-03-03

## 2022-03-03 NOTE — TELEPHONE ENCOUNTER
Did showed multiple lung nodules, but they are similar to the previous nodules, no further work up at this time

## 2022-03-07 ENCOUNTER — CARE COORDINATION (OUTPATIENT)
Dept: CARE COORDINATION | Age: 77
End: 2022-03-07

## 2022-03-07 NOTE — CARE COORDINATION
Ambulatory Care Coordination Note  3/7/2022  CM Risk Score: 0  Charlson 10 Year Mortality Risk Score: 23%     ACC: Vesta Avina RN    Summary Note:   Spoke with patient for ACM follow up. Patient reports that she is feeling well. Confirms contact per RD to support protein calorie malnutrition. Patient reports that it was very helpful. Confirms plan to incorporate high calorie protein; nutritional shakes into daily diet. Discussed care gaps. Patient reports that she has completed COVID vaccination series. Denies any questions in rt/ vaccines,pending labs. Advance Care Planning   Healthcare Decision Maker:    Primary Decision Maker: Alfredo Vogel  Vandana - [phone]    Today we documented Decision Maker(s) consistent with Legal Next of Kin hierarchy. Discussed ACP support Team and patient declines support; but is agreeable to have documents mailed to her home. Encouraged patient to reach out to [facility] is support needs arise. Patient denies any questions or concerns at this time. ACM contact information provided should needs arise. Plan for next outreach:  Ensure ACP support needs are met. Care Coordination Interventions    Program Enrollment: Complex Care  Referral from Primary Care Provider: No  Suggested Interventions and Community Resources  Fall Risk Prevention: In Process  Registered Dietician: In Process  Social Work: Declined  Other Services or Interventions: ACP documents         Goals Addressed                 This Visit's Progress     Wellness Goal   On track     Patient Self-Management Goal for Health Maintenance  Goal: I will follow a healthy diet as discussed by my provider. I will schedule a yearly preventative care visit. I will schedule screening colonoscopies as directed by my provider. I will schedule routine eye examinations with an eye specialist as directed by my provider. I will consider obtaining vaccines recommended by my provider.    Barriers: lack of support and overwhelmed by complexity of regimen  Plan for overcoming my barriers :  Patient will follow through with routine Provider follow up as directed. Patient to update provider of any condition changes. Confidence: 8/10  Anticipated Goal Completion Date: 5/21/22            Prior to Admission medications    Medication Sig Start Date End Date Taking? Authorizing Provider   spironolactone (ALDACTONE) 25 MG tablet Take 1 tablet by mouth daily 2/9/22   Lizz Carreon, MD   venlafaxine (EFFEXOR XR) 37.5 MG extended release capsule Take 1 capsule by mouth daily 2/9/22   Darral Manner, MD   metoprolol succinate (TOPROL XL) 100 MG extended release tablet Take 1 tablet by mouth daily 2/9/22   Darral Manner, MD   amLODIPine (NORVASC) 10 MG tablet Take 1 tablet by mouth daily 2/9/22   Darral Manner, MD   atorvastatin (LIPITOR) 10 MG tablet TAKE 1 TABLET DAILY 12/9/21   Darral Manner, MD   KLOR-CON M20 20 MEQ extended release tablet TAKE 1 TABLET DAILY 12/9/21   [facility] Manner, MD   anastrozole (ARIMIDEX) 1 MG tablet TAKE ONE TABLET BY MOUTH ONCE DAILY 4/13/21   Gene Garvey MD   alendronate (FOSAMAX) 70 MG tablet Take 1 tablet by mouth every 7 days 4/1/21   Lizz Carreon, MD   albuterol sulfate HFA (PROVENTIL HFA) 108 (90 Base) MCG/ACT inhaler Inhale 2 puffs into the lungs 9/30/19 2/21/22  Historical Provider, MD   Levothyroxine Sodium 88 MCG CAPS Take 88 mcg by mouth Daily     Historical Provider, MD   calcium-vitamin D (OSCAL-500) 500-200 MG-UNIT per tablet Take 1 tablet by mouth daily.     Historical Provider, MD       Future Appointments   Date Time Provider Martin Edge   8/10/2022  9:00 AM Lizz Carreon, MD [facility]   12/13/2022  9:15 AM Dom Gupta APRN - CNP [address] [facility]      and   General Assessment    Do you have any symptoms that are causing concern?: No

## 2022-03-14 ENCOUNTER — CARE COORDINATION (OUTPATIENT)
Dept: CARE COORDINATION | Age: 77
End: 2022-03-14

## 2022-03-14 NOTE — CARE COORDINATION
Attempted to reach patient for ACM follow up. No answer to phone. Message left with ACM contact information and request for call back. Ambulatory Care Coordination Note  3/14/2022  CM Risk Score: 0  Charlson 10 Year Mortality Risk Score: 23%     ACC: Alexia Nunez, RN    Summary Note:   Spoke with patient for ACM follow up. Patient reports that she is feeling fine. Reports that she is a little tired today. Encouraged frequent rest periods, pace activity to avoid over exertion. Patient reports that her appetite has niecy good. B &B status is WNL. Discussed care gaps, patient denies any questions. Patient denies any questions, equipment or resource needs. ACM contact information provided should needs arise. Plan for next outreach:  ACP support            Care Coordination Interventions    Program Enrollment: Complex Care  Referral from Primary Care Provider: No  Suggested Interventions and Community Resources  Fall Risk Prevention: In Process  Registered Dietician: In Process  Social Work: Declined  Other Services or Interventions: [facility] documents         Goals Addressed                 This Visit's Progress     Wellness Goal   On track     Patient Self-Management Goal for Health Maintenance  Goal: I will follow a healthy diet as discussed by my provider. I will schedule a yearly preventative care visit. I will schedule screening colonoscopies as directed by my provider. I will schedule routine eye examinations with an eye specialist as directed by my provider. I will consider obtaining vaccines recommended by my provider. Barriers: lack of support and overwhelmed by complexity of regimen  Plan for overcoming my barriers :  Patient will follow through with routine Provider follow up as directed. Patient to update provider of any condition changes.    Confidence: 8/10  Anticipated Goal Completion Date: 5/21/22            Prior to Admission medications    Medication Sig Start Date End Date Taking? Authorizing Provider   spironolactone (ALDACTONE) 25 MG tablet Take 1 tablet by mouth daily 2/9/22   Chema Álvarez MD   venlafaxine (EFFEXOR XR) 37.5 MG extended release capsule Take 1 capsule by mouth daily 2/9/22   Chema Álvarez MD   metoprolol succinate (TOPROL XL) 100 MG extended release tablet Take 1 tablet by mouth daily 2/9/22   Chema Álvarez MD   amLODIPine (NORVASC) 10 MG tablet Take 1 tablet by mouth daily 2/9/22   Chema Álvarez MD   atorvastatin (LIPITOR) 10 MG tablet TAKE 1 TABLET DAILY 12/9/21   Chema Álvarez MD   KLOR-CON M20 20 MEQ extended release tablet TAKE 1 TABLET DAILY 12/9/21   Chema Álvarez MD   anastrozole (ARIMIDEX) 1 MG tablet TAKE ONE TABLET BY MOUTH ONCE DAILY 4/13/21   Marquetta Kayser, MD   alendronate (FOSAMAX) 70 MG tablet Take 1 tablet by mouth every 7 days 4/1/21   Chema Álvarez MD   albuterol sulfate HFA (PROVENTIL HFA) 108 (90 Base) MCG/ACT inhaler Inhale 2 puffs into the lungs 9/30/19 2/21/22  Historical Provider, MD   Levothyroxine Sodium 88 MCG CAPS Take 88 mcg by mouth Daily     Historical Provider, MD   calcium-vitamin D (OSCAL-500) 500-200 MG-UNIT per tablet Take 1 tablet by mouth daily.     Historical Provider, MD       Future Appointments   Date Time Provider Martin Edge   8/10/2022  9:00 AM [facility]   12/13/2022  9:15 AM Jack Bond, APRN - CNP [address] [facility]      and   General Assessment    Do you have any symptoms that are causing concern?: No

## 2022-03-21 ENCOUNTER — CARE COORDINATION (OUTPATIENT)
Dept: CARE COORDINATION | Age: 77
End: 2022-03-21

## 2022-03-22 ENCOUNTER — CARE COORDINATION (OUTPATIENT)
Dept: CARE COORDINATION | Age: 77
End: 2022-03-22

## 2022-03-22 NOTE — CARE COORDINATION
Hunter Alvarez  3/22/2022    Registered Dietitian Progress Note for Care Coordination    Assessment: Tram Adams is a 78 y.o. female. RD referred for protein calorie malnutrition secondary to breast CA/thyroid CA/ UC with colostomy. RD spoke with patient for initial nutrition assessment on 3/1. RD called to follow up with pt today 3/22. Pt states she has not yet received the educational handouts in the mail- RD verified address and will resend. RD discussed previous goals with pt. Patient states her appetite is good and she is eating three meals/day. RD discussed the importance of incorporating a protein source to each meal such as eggs or peanut butter to toast instead of cream cheese. Patient explains she eats a big breakfast with eggs on Saturday and Sunday with family but during the week she usually eats a smaller breakfast. Patient states she is not drinking Ensure or Boost. RD reiterated the importance of supplementing with Ensure or Boost at least once a day and aiming for BID to help better meet estimated nutrition needs. No further weight loss reported per pt. RD recommended patient buy Ensure with coupons RD mailed and start drinking at least one a day. Pt has no nutrition related questions at this time. Nutrition Monitoring and Evaluation  Indicator/Goal Criteria Progress   #1 Eat balanced meals consistently throughout the day. #1 Focus on eating 3 meals/day and incorporate a protein at each meal.  #1 Pt's appetite is good and she is eating 3 meals/day. #2  Supplement with Ensure or Boost daily to help better meet estimated nutrition needs. #2 Start drinking Ensure or Boost at least once a day. Aim for BID. #2 Patient is not drinking a nutrition supplement daily. Plan of Care:  RD encouraged pt to keep working toward goals set. RD mailed educational handouts, pt has not yet received them.  RD will follow up with pt to ensure handouts were received, discuss any questions pt has and check the progress toward goals. Follow Up:    RD will call pt in 3 weeks to follow up and answer any nutrition related questions at that time.      [facility], [address] [place] [address]

## 2022-03-28 ENCOUNTER — CARE COORDINATION (OUTPATIENT)
Dept: CARE COORDINATION | Age: 77
End: 2022-03-28

## 2022-03-28 NOTE — CARE COORDINATION
Ambulatory Care Coordination Note  3/28/2022  CM Risk Score: 0  Charlson 10 Year Mortality Risk Score: 23%     ACC: Magaly Heck RN    Summary Note:   Spoke with patient for ACM follow up. Patient reports that she is feeling well. Confirms that her appetite is good and she is eating 3 meals a day. Confirms plan to increase nutritional shake once daily as tolerated to support increased protein intake. Denies any questions. ACP support. Patient is agreeable to have paperwork mailed to her home. Reports that she would like to review with family and then will reach out to [facility] if support is needed to complete documents. Discussed care gaps. Patient denies any questions. AC contact information provided should needs arise. Referral made to [facility] for ACP support. Plan for next outreach:  ACP follow up              Care Coordination Interventions    Program Enrollment: Complex Care  Referral from Primary Care Provider: No  Suggested Interventions and Community Resources  Fall Risk Prevention: In Process  Registered Dietician: In Process  Social Work: In Process  Other Services or Interventions: ACP documents         Goals Addressed                 This Visit's Progress     Wellness Goal   On track     Patient Self-Management Goal for Health Maintenance  Goal: I will follow a healthy diet as discussed by my provider. I will schedule a yearly preventative care visit. I will schedule screening colonoscopies as directed by my provider. I will schedule routine eye examinations with an eye specialist as directed by my provider. I will consider obtaining vaccines recommended by my provider. Barriers: lack of support and overwhelmed by complexity of regimen  Plan for overcoming my barriers :  Patient will follow through with routine Provider follow up as directed. Patient to update provider of any condition changes.    Confidence: 8/10  Anticipated Goal Completion Date: 5/21/22            Prior to Admission medications    Medication Sig Start Date End Date Taking? Authorizing Provider   spironolactone (ALDACTONE) 25 MG tablet Take 1 tablet by mouth daily 2/9/22   Jessica Guzman MD   venlafaxine (EFFEXOR XR) 37.5 MG extended release capsule Take 1 capsule by mouth daily 2/9/22   Jessica Guzman MD   metoprolol succinate (TOPROL XL) 100 MG extended release tablet Take 1 tablet by mouth daily 2/9/22   Jessica Guzman MD   amLODIPine (NORVASC) 10 MG tablet Take 1 tablet by mouth daily 2/9/22   Jessica Guzman MD   atorvastatin (LIPITOR) 10 MG tablet TAKE 1 TABLET DAILY 12/9/21   Jessica Guzman MD   KLOR-CON M20 20 MEQ extended release tablet TAKE 1 TABLET DAILY 12/9/21   Jessica Guzman MD   anastrozole (ARIMIDEX) 1 MG tablet TAKE ONE TABLET BY MOUTH ONCE DAILY 4/13/21   Loni Galeazzi, MD   alendronate (FOSAMAX) 70 MG tablet Take 1 tablet by mouth every 7 days 4/1/21   Jessica Guzman MD   albuterol sulfate HFA (PROVENTIL HFA) 108 (90 Base) MCG/ACT inhaler Inhale 2 puffs into the lungs 9/30/19 2/21/22  Historical Provider, MD   Levothyroxine Sodium 88 MCG CAPS Take 88 mcg by mouth Daily     Historical Provider, MD   calcium-vitamin D (OSCAL-500) 500-200 MG-UNIT per tablet Take 1 tablet by mouth daily.     Historical Provider, MD       Future Appointments   Date Time Provider Martni Edge   8/10/2022  9:00 AM [facility]   12/13/2022  9:15 AM Latrice Yeboah APRN - CNP [facility]      and   General Assessment

## 2022-03-30 ENCOUNTER — CARE COORDINATION (OUTPATIENT)
Dept: CARE COORDINATION | Age: 77
End: 2022-03-30

## 2022-03-30 DIAGNOSIS — M81.0 OSTEOPOROSIS WITHOUT CURRENT PATHOLOGICAL FRACTURE, UNSPECIFIED OSTEOPOROSIS TYPE: ICD-10-CM

## 2022-03-30 NOTE — CARE COORDINATION
Mailed ACP paperwork to pt. Will check with her in 2 weeks to see if she received and has any questions.

## 2022-03-31 RX ORDER — ALENDRONATE SODIUM 70 MG/1
TABLET ORAL
Qty: 12 TABLET | Refills: 3 | Status: SHIPPED | OUTPATIENT
Start: 2022-03-31

## 2022-04-12 ENCOUNTER — CARE COORDINATION (OUTPATIENT)
Dept: CARE COORDINATION | Age: 77
End: 2022-04-12

## 2022-04-12 NOTE — CARE COORDINATION
Contacted Felix Hart and left voicemail regarding Dietitian follow up. Left call back number and will follow up as appropriate.        [address] [facility], [address] [place] [address]

## 2022-04-13 ENCOUNTER — CARE COORDINATION (OUTPATIENT)
Dept: CARE COORDINATION | Age: 77
End: 2022-04-13

## 2022-04-13 NOTE — CARE COORDINATION
Called pt to follow up with ACP paperwork. Pt unavailable at the time of my call and vm was left asking pt to return my call to [phone].

## 2022-04-18 ENCOUNTER — TELEPHONE (OUTPATIENT)
Dept: FAMILY MEDICINE CLINIC | Age: 77
End: 2022-04-18

## 2022-04-18 NOTE — TELEPHONE ENCOUNTER
Patient called and wanted to know what she should do to not being able to swallow even salvia at this time. Spoke to Dr. Heron Nicole due to provider not being in office, and she advised that the patient go to the Ed for evaluation.

## 2022-04-19 ENCOUNTER — CARE COORDINATION (OUTPATIENT)
Dept: CARE COORDINATION | Age: 77
End: 2022-04-19

## 2022-04-19 NOTE — CARE COORDINATION
Contacted Kerry Schafer and left voicemail regarding Dietitian follow up. Left call back number. RD spoke with patient for initial nutrition assessment on 3/1 and has been following up with patient. RD outreached 4/12 and today 4/18- left VM both outreaches. RD will notify Tonja ARAUZ. RD will continue to follow/assist with patient return call.        [address] [facility], [address] [place] [address]

## 2022-04-26 ENCOUNTER — CARE COORDINATION (OUTPATIENT)
Dept: CARE COORDINATION | Age: 77
End: 2022-04-26

## 2022-04-26 NOTE — CARE COORDINATION
Ambulatory Care Coordination Note  4/26/2022  CM Risk Score: 0  Charlson 10 Year Mortality Risk Score: 23%     ACC: Uvaldo Tobias RN    Summary Note:   Spoke with patient for ACM follow up. Patient reports that she is doing ok. Reports that she was having trouble swallowing last week and had to be admitted for procedure overnight. Patient reports that she was put on a Pureed diet. Instructed on Pureed diet; encouraged patient to eat foods that do not require chewing such as mashed potatoes or pudding. Instructed patient to blend or strain foods to make them smoother. Patient reports that she is taking her medication as directed and is following dietary restrictions. Reports that she is staying hydrated and B&B status is WNL. ACP documents. Patient confirms that she received requested paperwork and will need support to complete information. Reports that she is going out of town for a wedding and will be available after May 18th for ACP support. ACM to update ACP Specialist.    Patient denies any questions, equipment or other resource needs. ACM contact information provided. Encouraged return call should needs arise. Plan for next outreach: Address care gaps, support needs    Note routed to ACP Specialist with referral.         Care Coordination Interventions    Program Enrollment: Complex Care  Referral from Primary Care Provider: No  Suggested Interventions and Community Resources  Fall Risk Prevention: In Process  Registered Dietician: In Process  Social Work: In Process  Other Services or Interventions: ACP documents         Goals Addressed                 This Visit's Progress     Wellness Goal   On track     Patient Self-Management Goal for Health Maintenance  Goal: I will follow a healthy diet as discussed by my provider. I will schedule a yearly preventative care visit. I will schedule screening colonoscopies as directed by my provider.   I will schedule routine eye examinations with an eye specialist as directed by my provider. I will consider obtaining vaccines recommended by my provider. Barriers: lack of support and overwhelmed by complexity of regimen  Plan for overcoming my barriers :  Patient will follow through with routine Provider follow up as directed. Patient to update provider of any condition changes. Confidence: 8/10  Anticipated Goal Completion Date: 5/21/22            Prior to Admission medications    Medication Sig Start Date End Date Taking? Authorizing Provider   alendronate (FOSAMAX) 70 MG tablet TAKE 1 TABLET EVERY 7 DAYS 3/31/22   Manuel Pickard MD   spironolactone (ALDACTONE) 25 MG tablet Take 1 tablet by mouth daily 2/9/22   Manuel Pickard MD   venlafaxine (EFFEXOR XR) 37.5 MG extended release capsule Take 1 capsule by mouth daily 2/9/22   Manuel Pickard MD   metoprolol succinate (TOPROL XL) 100 MG extended release tablet Take 1 tablet by mouth daily 2/9/22   Manuel Pickadr MD   amLODIPine (NORVASC) 10 MG tablet Take 1 tablet by mouth daily 2/9/22   Manuel Pickard MD   atorvastatin (LIPITOR) 10 MG tablet TAKE 1 TABLET DAILY 12/9/21   Manuel Pickard MD   KLOR-CON M20 20 MEQ extended release tablet TAKE 1 TABLET DAILY 12/9/21   Manuel Pickard MD   anastrozole (ARIMIDEX) 1 MG tablet TAKE ONE TABLET BY MOUTH ONCE DAILY 4/13/21   Wendy Camargo MD   albuterol sulfate HFA (PROVENTIL HFA) 108 (90 Base) MCG/ACT inhaler Inhale 2 puffs into the lungs 9/30/19 2/21/22  Historical Provider, MD   Levothyroxine Sodium 88 MCG CAPS Take 88 mcg by mouth Daily     Historical Provider, MD   calcium-vitamin D (OSCAL-500) 500-200 MG-UNIT per tablet Take 1 tablet by mouth daily.     Historical Provider, MD       Future Appointments   Date Time Provider Martin Edge   8/10/2022  9:00 AM MD Raf[facility]   12/13/2022  9:15 AM Rosario Garcia, APRN - CNP [facility]      and   General Assessment

## 2022-05-17 ENCOUNTER — CARE COORDINATION (OUTPATIENT)
Dept: CARE COORDINATION | Age: 77
End: 2022-05-17

## 2022-05-20 ENCOUNTER — HOSPITAL ENCOUNTER (OUTPATIENT)
Age: 77
Setting detail: SPECIMEN
Discharge: HOME | End: 2022-05-20
Payer: MEDICARE

## 2022-05-20 ENCOUNTER — CLINICAL DOCUMENTATION (OUTPATIENT)
Dept: SPIRITUAL SERVICES | Age: 77
End: 2022-05-20

## 2022-05-20 ENCOUNTER — OFFICE VISIT (OUTPATIENT)
Dept: FAMILY MEDICINE CLINIC | Age: 77
End: 2022-05-20
Payer: MEDICARE

## 2022-05-20 VITALS
OXYGEN SATURATION: 95 % | WEIGHT: 108 LBS | BODY MASS INDEX: 21.2 KG/M2 | HEIGHT: 60 IN | TEMPERATURE: 98.4 F | HEART RATE: 104 BPM

## 2022-05-20 DIAGNOSIS — J40 BRONCHITIS: Primary | ICD-10-CM

## 2022-05-20 PROCEDURE — U0003 INFECTIOUS AGENT DETECTION BY NUCLEIC ACID (DNA OR RNA); SEVERE ACUTE RESPIRATORY SYNDROME CORONAVIRUS 2 (SARS-COV-2) (CORONAVIRUS DISEASE [COVID-19]), AMPLIFIED PROBE TECHNIQUE, MAKING USE OF HIGH THROUGHPUT TECHNOLOGIES AS DESCRIBED BY CMS-2020-01-R: HCPCS

## 2022-05-20 PROCEDURE — 99213 OFFICE O/P EST LOW 20 MIN: CPT | Performed by: NURSE PRACTITIONER

## 2022-05-20 PROCEDURE — U0005 INFEC AGEN DETEC AMPLI PROBE: HCPCS

## 2022-05-20 RX ORDER — DOXYCYCLINE HYCLATE 100 MG
100 TABLET ORAL 2 TIMES DAILY
Qty: 20 TABLET | Refills: 0 | Status: SHIPPED | OUTPATIENT
Start: 2022-05-20 | End: 2022-05-30

## 2022-05-20 NOTE — PROGRESS NOTES
5/20/22  Asaf Maria Elena  1945    FLU/COVID-19 CLINIC EVALUATION    HPI SYMPTOMS:    Employer: Retired    [] Fevers  [] Chills  [x] Cough  [] Coughing up blood  [] Chest Congestion  [x] Nasal Congestion  [x] Feeling short of breath  [x] Sometimes  [] Frequently  [] All the time  [] Chest pain  [] Headaches  []Tolerable  [] Severe  [] Sore throat  [] Muscle aches  [] Nausea  [] Vomiting  []Unable to keep fluids down  [] Diarrhea  []Severe    [] OTHER SYMPTOMS:      Symptom Duration:   [] 1  [x] 2   [] 3   [] 4    [] 5   [] 6   [] 7   [] 8   [] 9   [] 10   [] 11   [] 12   [] 13   [] 14   [] Longer than 14 days    Symptom course:   [] Worsening     [x] Stable     [] Improving    RISK FACTORS:    [] Pregnant or possibly pregnant  [x] Age over 61  [] Diabetes  [] Heart disease  [] Asthma  [] COPD/Other chronic lung diseases  [] Active Cancer  [] On Chemotherapy  [] Taking oral steroids  [] History Lymphoma/Leukemia  [] Close contact with a lab confirmed COVID-19 patient within 14 days of symptom onset  [x] History of travel from affected geographical areas within 14 days of symptom onset ([place])       VITALS:  There were no vitals filed for this visit. TESTS:    POCT FLU:  [] Positive     []Negative    ASSESSMENT:    [] Flu  [] Possible COVID-19  [] Strep    PLAN:    [] Discharge home with written instructions for:  [] Flu management  [] Possible COVID-19 infection with self-quarantine and management of symptoms  [] Follow-up with primary care physician or emergency department if worsens  [] Evaluation per physician/NP/PA in clinic  [] Sent to ER       An  electronic signature was used to authenticate this note.      --Fredna Lesch, LPN on 4/58/7517 at 2:80 PM

## 2022-05-20 NOTE — ACP (ADVANCE CARE PLANNING)
Advance Care Planning   Ambulatory ACP Specialist Patient Outreach    Date:  5/20/2022  ACP Specialist:  LOGAN Thomas    Outreach call to patient in follow-up to ACP Specialist referral from: Estrella Gallagher MD    [] PCP  [] Provider   [x] Ambulatory Care Management [] Other for Reason:    [x] Advance Directive Assistance  [] Code Status Discussion  [] Complete Portable DNR Order  [] Discuss Goals of Care  [] Complete POST/MOST  [] Early ACP Decision-Making  [] Other    Date Referral Received: 05/05/2022    Today's Outreach:  [x] First   [] Second  [] Third                               Third outreach made by []  phone  [] email []   Finariohart     Intervention:  [] Spoke with Patient  [x] Left VM requesting return call      Outcome: Per Care Coordinator referral instruction--made first outreach to pt today (anytime after 5/18 roshan Live RN). No answer; did leave a VM and will make another outreach attempt next week. Next Step:   [] ACP scheduled conversation  [x] Outreach again in one week               [] Email / Mail ACP Info Sheets  [] Email / Mail Advance Directive            [] Close Referral. Routing closure to referring provider/staff and to ACP Specialist . Thank you for this referral.    5/24/2022 ADDENDUM: Roshan Live RN care coordinator-pt recovering from flu and requested ACP specialist reach out next week instead of this week.

## 2022-05-20 NOTE — PROGRESS NOTES
5/20/2022    HPI:  Chief complaint and history of present illness as per medical assistant/nurse documented today in the Flu/COVID-19 clinic. Patient is here with complaints of cough, chest/nasal congestion, and SOB at times x 2 days. Patient states she has had her covid vaccine. MEDICATIONS:  Prior to Visit Medications    Medication Sig Taking? Authorizing Provider   doxycycline hyclate (VIBRA-TABS) 100 MG tablet Take 1 tablet by mouth 2 times daily for 10 days Yes BLANCA Aguirre - CNP   alendronate (FOSAMAX) 70 MG tablet TAKE 1 TABLET EVERY 7 DAYS  Massiel Walter MD   spironolactone (ALDACTONE) 25 MG tablet Take 1 tablet by mouth daily  Massiel Walter MD   venlafaxine (EFFEXOR XR) 37.5 MG extended release capsule Take 1 capsule by mouth daily  Massiel Walter MD   metoprolol succinate (TOPROL XL) 100 MG extended release tablet Take 1 tablet by mouth daily  Massiel Walter MD   amLODIPine (NORVASC) 10 MG tablet Take 1 tablet by mouth daily  Massiel Walter MD   atorvastatin (LIPITOR) 10 MG tablet TAKE 1 TABLET DAILY  Massiel Walter MD   KLOR-CON M20 20 MEQ extended release tablet TAKE 1 TABLET DAILY  Massiel Walter MD   anastrozole (ARIMIDEX) 1 MG tablet TAKE ONE TABLET BY MOUTH ONCE DAILY  Jovita Bob MD   albuterol sulfate HFA (PROVENTIL HFA) 108 (90 Base) MCG/ACT inhaler Inhale 2 puffs into the lungs  Historical Provider, MD   Levothyroxine Sodium 88 MCG CAPS Take 88 mcg by mouth Daily   Historical Provider, MD   calcium-vitamin D (OSCAL-500) 500-200 MG-UNIT per tablet Take 1 tablet by mouth daily.   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 Artem Henriquez at [facility] History of therapeutic radiation     Hx antineoplastic chemo     Hypertension     [facility]ostomy care ([facility] [address]     Nausea & vomiting     \"got sick with one surgery\"    Thyroid ca [facility])     had thyroid cancer in 2005- tx with radioactive iodine and radiation    Thyroid disease     hypo    Ulcerative colitis ([facility] [address]    ,   Past Surgical History:   Procedure Laterality Date    APPENDECTOMY      BREAST LUMPECTOMY Left 10/13/14    left lumpectomy with needle localization    BREAST SURGERY      2001 lumpectomy right breast    ILEOSTOMY OR JEJUNOSTOMY Right 1966    colon surgery\"removed most of the large and part of small intestine\"'removed appendix at that time    JOINT REPLACEMENT Right     hip   [address]    \"removed a tumor from the left lung\"    RHINOPLASTY      TONSILLECTOMY  as a child   ,   Social History     Tobacco Use    Smoking status: Never Smoker    Smokeless tobacco: Never Used   Vaping Use    Vaping Use: Never used   Substance Use Topics    Alcohol use: Not Currently     Comment: average 3-4 times per year    Drug use: No   ,   Family History   Problem Relation Age of Onset    High Blood Pressure Mother     Cancer Father         liver cancer    Breast Cancer Maternal Aunt    ,   Immunization History   Administered Date(s) Administered    COVID-19, Jan James, Primary or Immunocompromised, PF, 100mcg/0.5mL 02/03/2021, 03/03/2021, 10/25/2021    Influenza, High Dose (Fluzone 65 yrs and older) 09/22/2015, 10/10/2016    Influenza, Quadv, IM, PF (6 mo and older Fluzone, Flulaval, Fluarix, and 3 yrs and older Afluria) 09/26/2018    Influenza, Quadv, adjuvanted, 65 yrs +, IM, PF (Fluad) 09/11/2020    Influenza, Triv, inactivated, subunit, adjuvanted, IM (Fluad 65 yrs and older) 10/16/2019    Pneumococcal Conjugate 13-valent Cisco Villarreal) 02/11/2016    Zoster Live (Zostavax) 02/13/2015   ,   Health Maintenance   Topic Date Due    Hepatitis C screen  Never done    DTaP/Tdap/Td vaccine (1 - Tdap) Never done    Shingles vaccine (2 of 3) 04/10/2015    A1C test (Diabetic or Prediabetic)  03/01/2022    Depression Monitoring  04/01/2022    Lipids  02/09/2023    DEXA (modify frequency per FRAX score)  Completed    Flu vaccine  Completed    Pneumococcal 65+ years Vaccine  Completed    COVID-19 Vaccine  Completed    Hepatitis A vaccine  Aged Out    Hepatitis B vaccine  Aged Out    Hib vaccine  Aged Out    Meningococcal (ACWY) vaccine  Aged Out       PHYSICAL EXAM:  Physical Exam  Constitutional:       Appearance: Normal appearance. HENT:      Head: Normocephalic. Right Ear: Tympanic membrane, ear canal and external ear normal.      Left Ear: Tympanic membrane, ear canal and external ear normal.      Nose: Nose normal.      Mouth/Throat:      Lips: Pink. Mouth: Mucous membranes are moist.      Pharynx: Oropharynx is clear. Cardiovascular:      Rate and Rhythm: Normal rate and regular rhythm. Heart sounds: Normal heart sounds. Pulmonary:      Effort: Pulmonary effort is normal.      Breath sounds: Normal breath sounds. Musculoskeletal:      Cervical back: Neck supple. Skin:     General: Skin is warm and dry. Neurological:      Mental Status: She is alert and oriented to person, place, and time. Psychiatric:         Mood and Affect: Mood normal.         Behavior: Behavior normal.         ASSESSMENT/PLAN:  1. Bronchitis  Your COVID 19 test can take 1-5 days for the results to come back. We ask that you make a Mychart page and view your test results this way. You will need to Self quarantine until you know your results. If positive, please work on contact tracing.     Increase fluids and rest  Saline nasal spray as needed for nasal congestion  Warm salt gargles as needed for throat discomfort  Monitor temperature twice a day  Tylenol as needed for fevers and/or discomfort. Big deep breaths periodically throughout the day  Regular Mucinex over the counter as needed for chest congestion  If symptoms worsen -Go to the ER. Follow up with your primary care provider  - COVID-19  - doxycycline hyclate (VIBRA-TABS) 100 MG tablet; Take 1 tablet by mouth 2 times daily for 10 days  Dispense: 20 tablet; Refill: 0      FOLLOW-UP:  Return if symptoms worsen or fail to improve.     In addition to other information, the printed after visit summary provided to the patient includes:  [x] COVID-19 Self care instructions  [x] COVID-19 General patient information

## 2022-05-22 LAB — SARS-COV-2: NOT DETECTED

## 2022-05-24 ENCOUNTER — CARE COORDINATION (OUTPATIENT)
Dept: CARE COORDINATION | Age: 77
End: 2022-05-24

## 2022-05-24 NOTE — CARE COORDINATION
Ambulatory Care Coordination Note  5/24/2022  CM Risk Score: 0  Charlson 10 Year Mortality Risk Score: 23%     ACC: Lisa Bishop RN    Summary Note:   Spoke with patient for ACM follow up. Patient reports that she is feeling ok; recovering from flu like symptoms. Reports continued fatigue, dry cough, congestion. Denies SOB, CP or extreme fatigue. Reports that she is feeling a little better than when she went to the [facility] [address]. Noted Rx. For Vibra tabs. Encouraged patient to complete entire course of antibiotic as directed. Encouraged rest/ hydration. Instructed on fluids to thin mucous; humidification for dry cough. Instructed on worsening s/s to report to MD.    ACP:  Patient requests to defer follow up to allow for rest.  ACM to update ACPS. Patient denies any questions or concerns at this time. ACM contact information provided should needs arise. Plan for next outreach; Address support needs, confirm ACP        Lab Results     None          Care Coordination Interventions    Program Enrollment: Complex Care  Referral from Primary Care Provider: No  Suggested Interventions and Community Resources  Fall Risk Prevention: In Process  Registered Dietician: In Process  Social Work: In Process  Other Services or Interventions: ACP documents         Goals Addressed                 This Visit's Progress     Wellness Goal   On track     Patient Self-Management Goal for Health Maintenance  Goal: I will follow a healthy diet as discussed by my provider. I will schedule a yearly preventative care visit. I will schedule screening colonoscopies as directed by my provider. I will schedule routine eye examinations with an eye specialist as directed by my provider. I will consider obtaining vaccines recommended by my provider. Barriers: lack of support and overwhelmed by complexity of regimen  Plan for overcoming my barriers :  Patient will follow through with routine Provider follow up as directed. Patient to update provider of any condition changes. Confidence: 8/10  Anticipated Goal Completion Date: 5/21/22            Prior to Admission medications    Medication Sig Start Date End Date Taking? Authorizing Provider   doxycycline hyclate (VIBRA-TABS) 100 MG tablet Take 1 tablet by mouth 2 times daily for 10 days 5/20/22 5/30/22  Sharion Snellen, APRN - CNP   alendronate (FOSAMAX) 70 MG tablet TAKE 1 TABLET EVERY 7 DAYS 3/31/22   Adrian Figueroa MD   spironolactone (ALDACTONE) 25 MG tablet Take 1 tablet by mouth daily 2/9/22   Adrian Figueroa MD   venlafaxine (EFFEXOR XR) 37.5 MG extended release capsule Take 1 capsule by mouth daily 2/9/22   Adrian Figueroa MD   metoprolol succinate (TOPROL XL) 100 MG extended release tablet Take 1 tablet by mouth daily 2/9/22   Adrian Figueroa MD   amLODIPine (NORVASC) 10 MG tablet Take 1 tablet by mouth daily 2/9/22   Adrian Figueroa MD   atorvastatin (LIPITOR) 10 MG tablet TAKE 1 TABLET DAILY 12/9/21   Adrian Figueroa MD   KLOR-CON M20 20 MEQ extended release tablet TAKE 1 TABLET DAILY 12/9/21   Adrian Figueroa MD   anastrozole (ARIMIDEX) 1 MG tablet TAKE ONE TABLET BY MOUTH ONCE DAILY 4/13/21   Heather Zhu MD   albuterol sulfate HFA (PROVENTIL HFA) 108 (90 Base) MCG/ACT inhaler Inhale 2 puffs into the lungs 9/30/19 2/21/22  Historical Provider, MD   Levothyroxine Sodium 88 MCG CAPS Take 88 mcg by mouth Daily     Historical Provider, MD   calcium-vitamin D (OSCAL-500) 500-200 MG-UNIT per tablet Take 1 tablet by mouth daily.     Historical Provider, MD       Future Appointments   Date Time Provider Martin Edge   8/10/2022  9:00 AM MD Rafia Smith [facility]   12/13/2022  9:15 AM BLANCA Belle - CNP [address] [facility]      and   General Assessment    Do you have any symptoms that are causing concern?: No

## 2022-05-31 ENCOUNTER — CARE COORDINATION (OUTPATIENT)
Dept: CARE COORDINATION | Age: 77
End: 2022-05-31

## 2022-05-31 NOTE — CARE COORDINATION
Ambulatory Care Coordination Note  5/31/2022  CM Risk Score: 0  Charlson 10 Year Mortality Risk Score: 23%     ACC: Timur Villeda, RN    Summary Note:   Spoke with patient for ACM follow up. Patient reports that flu like symptoms are resolving. Reports that she still has \" a bit of a cough\". Encouraged rest; hydration. Instructed on fluids to thin mucous. Reviewed worsening s/s to report to MD.    Discussed care gaps. Patient denies any questions. Patient reports that she is taking her medications and her symptoms are well managed at this time. Instructed on the recommendation for routine Provider follow up. Offered to schedule appointment with PCP; patient declined. Patient denies any questions, equipment or resource needs. ACM contact information provided. Encouraged return call if needs arise. Plan for next outreach:  Confirm ACP support; progress toward graduation            Lab Results     None          Care Coordination Interventions    Program Enrollment: Complex Care  Referral from Primary Care Provider: No  Suggested Interventions and Community Resources  Fall Risk Prevention: In Process  Registered Dietician: In Process  Social Work: In Process  Other Services or Interventions: ACP documents         Goals Addressed                 This Visit's Progress     Wellness Goal   No change     Patient Self-Management Goal for Health Maintenance  Goal: I will follow a healthy diet as discussed by my provider. I will schedule a yearly preventative care visit. I will schedule screening colonoscopies as directed by my provider. I will schedule routine eye examinations with an eye specialist as directed by my provider. I will consider obtaining vaccines recommended by my provider. Barriers: lack of support and overwhelmed by complexity of regimen  Plan for overcoming my barriers :  Patient will follow through with routine Provider follow up as directed.   Patient to update provider of any condition changes. Confidence: 8/10  Anticipated Goal Completion Date: 5/21/22            Prior to Admission medications    Medication Sig Start Date End Date Taking? Authorizing Provider   alendronate (FOSAMAX) 70 MG tablet TAKE 1 TABLET EVERY 7 DAYS 3/31/22   Juventino Lynn MD   spironolactone (ALDACTONE) 25 MG tablet Take 1 tablet by mouth daily 2/9/22   Juventino Lynn MD   venlafaxine (EFFEXOR XR) 37.5 MG extended release capsule Take 1 capsule by mouth daily 2/9/22   Juventino Lynn MD   metoprolol succinate (TOPROL XL) 100 MG extended release tablet Take 1 tablet by mouth daily 2/9/22   Juventino Lynn MD   amLODIPine (NORVASC) 10 MG tablet Take 1 tablet by mouth daily 2/9/22   Juventino Lynn MD   atorvastatin (LIPITOR) 10 MG tablet TAKE 1 TABLET DAILY 12/9/21   Juventino Lynn MD   KLOR-CON M20 20 MEQ extended release tablet TAKE 1 TABLET DAILY 12/9/21   Juventino Lynn MD   anastrozole (ARIMIDEX) 1 MG tablet TAKE ONE TABLET BY MOUTH ONCE DAILY 4/13/21   Talisha Olvera MD   albuterol sulfate HFA (PROVENTIL HFA) 108 (90 Base) MCG/ACT inhaler Inhale 2 puffs into the lungs 9/30/19 2/21/22  Historical Provider, MD   Levothyroxine Sodium 88 MCG CAPS Take 88 mcg by mouth Daily     Historical Provider, MD   calcium-vitamin D (OSCAL-500) 500-200 MG-UNIT per tablet Take 1 tablet by mouth daily.     Historical Provider, MD       Future Appointments   Date Time Provider Martin Edge   8/10/2022  9:00 AM MD Rafia Green [facility]   12/13/2022  9:15 AM Ghislaine Gutierrez APRN - CNP [address] [facility]      and   General Assessment    Do you have any symptoms that are causing concern?: No

## 2022-06-02 ENCOUNTER — CLINICAL DOCUMENTATION (OUTPATIENT)
Dept: SPIRITUAL SERVICES | Age: 77
End: 2022-06-02

## 2022-06-02 NOTE — ACP (ADVANCE CARE PLANNING)
Advance Care Planning   Ambulatory ACP Specialist Patient Outreach    Date:  6/2/2022  ACP Specialist:  LOGAN Espino    Outreach call to patient in follow-up to ACP Specialist referral from: Malik Barros MD    [] PCP  [] Provider   [x] Ambulatory Care Management [] Other for Reason:    [x] Advance Directive Assistance  [] Code Status Discussion  [] Complete Portable DNR Order  [] Discuss Goals of Care  [] Complete POST/MOST  [] Early ACP Decision-Making  [] Other    Date Referral Received: 05/05/2022    Today's Outreach:  [] First   [x] Second  [] Third                               Third outreach made by []  phone  [] email []   go2 mediat     Intervention:  [] Spoke with Patient  [x] Left VM requesting return call      Outcome: Placed call to pt to check in and see if/when she will be ready to start ACP conversation. No answer; left VM and will try to reach out to pt next week. Next Step:   [] ACP scheduled conversation  [x] Outreach again in one week               [] Email / Mail ACP Info Sheets  [] Email / Mail Advance Directive            [] Close Referral. Routing closure to referring provider/staff and to ACP Specialist .      Thank you for this referral.

## 2022-06-09 ENCOUNTER — CLINICAL DOCUMENTATION (OUTPATIENT)
Dept: SPIRITUAL SERVICES | Age: 77
End: 2022-06-09

## 2022-06-09 NOTE — ACP (ADVANCE CARE PLANNING)
Advance Care Planning   Ambulatory ACP Specialist Patient Outreach    Date:  6/9/2022  ACP Specialist:  José Manuel January, LISW    Outreach call to patient in follow-up to ACP Specialist referral from: Reji Aguilera MD    [] PCP  [] Provider   [x] Ambulatory Care Management [] Other for Reason:    [x] Advance Directive Assistance  [] Code Status Discussion  [] Complete Portable DNR Order  [] Discuss Goals of Care  [] Complete POST/MOST  [] Early ACP Decision-Making  [] Other    Date Referral Received: 05/05/2022    Today's Outreach:  [] First   [] Second  [x] Third                               Third outreach made by []  phone  [] email []   [employer] [address] [employer]     Intervention:  [] Spoke with Patient  [x] Left VM requesting return call      Outcome: Placed call to pt today re: ACP support and education. No answer; left general VM with SW contact. Placed call to alt number listed and spoke with pt's daughter (consent on file). Per daughter, pt should be home and answer the call ([phone]). Daughter stated she will call pt as well and ask that she reach out to this SW. Will review with care coordinator re: next steps. Next Step:   [] ACP scheduled conversation  [] Outreach again in one week               [] Email / Mail ACP Info Sheets  [] Email / Mail Advance Directive            [x] Close Referral. Update care coordinator. Routing closure to referring provider/staff and to ACP Specialist .      Thank you for this referral.

## 2022-06-16 ENCOUNTER — TELEPHONE (OUTPATIENT)
Dept: SURGERY | Age: 77
End: 2022-06-16

## 2022-06-16 RX ORDER — ANASTROZOLE 1 MG/1
TABLET ORAL
Qty: 90 TABLET | Refills: 3 | Status: SHIPPED | OUTPATIENT
Start: 2022-06-16

## 2022-06-16 NOTE — TELEPHONE ENCOUNTER
Sent refill to her mail in pharmacy. If she needs it sent somewhere else, please let me know.   Thanks

## 2022-06-22 ENCOUNTER — CARE COORDINATION (OUTPATIENT)
Dept: CARE COORDINATION | Age: 77
End: 2022-06-22

## 2022-06-29 DIAGNOSIS — E87.6 HYPOKALEMIA: ICD-10-CM

## 2022-06-30 RX ORDER — POTASSIUM CHLORIDE 1500 MG/1
TABLET, EXTENDED RELEASE ORAL
Qty: 90 TABLET | Refills: 1 | Status: SHIPPED | OUTPATIENT
Start: 2022-06-30

## 2022-07-05 ENCOUNTER — CARE COORDINATION (OUTPATIENT)
Dept: CARE COORDINATION | Age: 77
End: 2022-07-05

## 2022-07-11 ENCOUNTER — CARE COORDINATION (OUTPATIENT)
Dept: CARE COORDINATION | Age: 77
End: 2022-07-11

## 2022-07-18 ENCOUNTER — CARE COORDINATION (OUTPATIENT)
Dept: CARE COORDINATION | Age: 77
End: 2022-07-18

## 2022-07-18 NOTE — CARE COORDINATION
Spoke with patient briefly for ACM follow up. Patient reports that she is doing well. Reports that she is getting ready to leave with a friend and is unavailable to talk. Patient requests ACM outreach at another time. ACM contact information provided should needs arise. Plan for graduation next outreach should needs arise.

## 2022-07-27 ENCOUNTER — CARE COORDINATION (OUTPATIENT)
Dept: CARE COORDINATION | Age: 77
End: 2022-07-27

## 2022-07-27 NOTE — CARE COORDINATION
Ambulatory Care Coordination Note  7/27/2022    ACC: Lyly Tolliver, RICKY    Summary Note:  Spoke with patient for ACM follow up: plan to progress toward graduation. Patient reports that she is doing well. Reports that she is taking her medications as directed and her symptoms are well managed at this time. Patient reports that she is staying with her daughter and her support needs are being met. Instructed on the recommendation for routine Provider follow up. Confirmed upcoming appt. Date and time. Patient denied need for schedule change. Denies any questions, equipment or resource needs. Agreeable to plan for graduation. ACM contact information provided. Encouraged patient to return call if needs arise. No further outreach planned. Lab Results       None            Care Coordination Interventions    Program Enrollment: Complex Care  Referral from Primary Care Provider: No  Suggested Interventions and Community Resources  Fall Risk Prevention: In Process  Registered Dietician: In Process  Social Work: In Process  Other Services or Interventions: ACP documents          Goals Addressed                   This Visit's Progress     Wellness Goal   On track     Patient Self-Management Goal for Health Maintenance  Goal: I will follow a healthy diet as discussed by my provider. I will schedule a yearly preventative care visit. I will schedule screening colonoscopies as directed by my provider. I will schedule routine eye examinations with an eye specialist as directed by my provider. I will consider obtaining vaccines recommended by my provider. Barriers: lack of support and overwhelmed by complexity of regimen  Plan for overcoming my barriers :  Patient will follow through with routine Provider follow up as directed. Patient to update provider of any condition changes.    Confidence: 8/10  Anticipated Goal Completion Date: 5/21/22              Prior to Admission medications    Medication Sig Start Date End Date Taking? Authorizing Provider   KLOR-CON M20 20 MEQ extended release tablet TAKE 1 TABLET DAILY 6/30/22   Matt Bernard MD   anastrozole (ARIMIDEX) 1 MG tablet TAKE ONE TABLET BY MOUTH ONCE DAILY 6/16/22   BLANCA Wang CNP   alendronate (FOSAMAX) 70 MG tablet TAKE 1 TABLET EVERY 7 DAYS 3/31/22   Matt Bernard MD   spironolactone (ALDACTONE) 25 MG tablet Take 1 tablet by mouth daily 2/9/22   Matt Bernard MD   venlafaxine (EFFEXOR XR) 37.5 MG extended release capsule Take 1 capsule by mouth daily 2/9/22   Matt Bernard MD   metoprolol succinate (TOPROL XL) 100 MG extended release tablet Take 1 tablet by mouth daily 2/9/22   Matt Bernard MD   amLODIPine (NORVASC) 10 MG tablet Take 1 tablet by mouth daily 2/9/22   Matt Bernard MD   atorvastatin (LIPITOR) 10 MG tablet TAKE 1 TABLET DAILY 12/9/21   Matt Bernard MD   albuterol sulfate HFA (PROVENTIL HFA) 108 (90 Base) MCG/ACT inhaler Inhale 2 puffs into the lungs 9/30/19 2/21/22  Historical Provider, MD   Levothyroxine Sodium 88 MCG CAPS Take 88 mcg by mouth Daily     Historical Provider, MD   calcium-vitamin D (OSCAL-500) 500-200 MG-UNIT per tablet Take 1 tablet by mouth daily.     Historical Provider, MD       Future Appointments   Date Time Provider Martin Edge   8/17/2022  8:15 [facility]   12/13/2022  9:15 AM BLANCA Wang CNP [address] [facility]    and   General Assessment    Do you have any symptoms that are causing concern?: No

## 2022-08-17 ENCOUNTER — OFFICE VISIT (OUTPATIENT)
Dept: FAMILY MEDICINE CLINIC | Age: 77
End: 2022-08-17
Payer: MEDICARE

## 2022-08-17 VITALS
WEIGHT: 111 LBS | HEIGHT: 60 IN | BODY MASS INDEX: 21.79 KG/M2 | HEART RATE: 68 BPM | DIASTOLIC BLOOD PRESSURE: 80 MMHG | OXYGEN SATURATION: 98 % | SYSTOLIC BLOOD PRESSURE: 145 MMHG

## 2022-08-17 DIAGNOSIS — I10 ESSENTIAL HYPERTENSION: Primary | ICD-10-CM

## 2022-08-17 DIAGNOSIS — E78.5 HYPERLIPIDEMIA, UNSPECIFIED HYPERLIPIDEMIA TYPE: ICD-10-CM

## 2022-08-17 DIAGNOSIS — E03.9 ACQUIRED HYPOTHYROIDISM: ICD-10-CM

## 2022-08-17 DIAGNOSIS — F32.5 MAJOR DEPRESSIVE DISORDER WITH SINGLE EPISODE, IN FULL REMISSION (HCC): ICD-10-CM

## 2022-08-17 PROCEDURE — 99214 OFFICE O/P EST MOD 30 MIN: CPT | Performed by: FAMILY MEDICINE

## 2022-08-17 PROCEDURE — 1123F ACP DISCUSS/DSCN MKR DOCD: CPT | Performed by: FAMILY MEDICINE

## 2022-08-17 ASSESSMENT — PATIENT HEALTH QUESTIONNAIRE - PHQ9
3. TROUBLE FALLING OR STAYING ASLEEP: 1
4. FEELING TIRED OR HAVING LITTLE ENERGY: 0
5. POOR APPETITE OR OVEREATING: 0
SUM OF ALL RESPONSES TO PHQ QUESTIONS 1-9: 1
2. FEELING DOWN, DEPRESSED OR HOPELESS: 0
SUM OF ALL RESPONSES TO PHQ QUESTIONS 1-9: 1
10. IF YOU CHECKED OFF ANY PROBLEMS, HOW DIFFICULT HAVE THESE PROBLEMS MADE IT FOR YOU TO DO YOUR WORK, TAKE CARE OF THINGS AT HOME, OR GET ALONG WITH OTHER PEOPLE: 0
SUM OF ALL RESPONSES TO PHQ QUESTIONS 1-9: 1
9. THOUGHTS THAT YOU WOULD BE BETTER OFF DEAD, OR OF HURTING YOURSELF: 0
7. TROUBLE CONCENTRATING ON THINGS, SUCH AS READING THE NEWSPAPER OR WATCHING TELEVISION: 0
SUM OF ALL RESPONSES TO PHQ QUESTIONS 1-9: 1
8. MOVING OR SPEAKING SO SLOWLY THAT OTHER PEOPLE COULD HAVE NOTICED. OR THE OPPOSITE, BEING SO FIGETY OR RESTLESS THAT YOU HAVE BEEN MOVING AROUND A LOT MORE THAN USUAL: 0
6. FEELING BAD ABOUT YOURSELF - OR THAT YOU ARE A FAILURE OR HAVE LET YOURSELF OR YOUR FAMILY DOWN: 0

## 2022-08-17 NOTE — PROGRESS NOTES
Shannan Batres  1945 08/20/22    Chief Complaint   Patient presents with    6 Month Follow-Up    Hypertension    Hyperlipidemia    Hypothyroidism    Depression           Patient here for 6 months f/u regarding HTN, HLD, hypothyroidism, s/p thyroid cancer, depression, and osteoporosis. The patient is taking hypertensive medications compliantly without side effects. Denies chest pain, dyspnea, edema, or TIA's. She dose f/u with Dr Nestor Blum for her hypothyroidism. 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 Andrea sEcobar [facility] [place]    History of therapeutic radiation     Hx antineoplastic chemo     Hypertension     Ileostomy care ([facility]     Nausea & vomiting     \"got sick with one surgery\"    Thyroid ca [facility])     had thyroid cancer in 2005- tx with radioactive iodine and radiation    Thyroid disease     hypo    Ulcerative colitis ([facility]      Past Surgical History:   Procedure Laterality Date    APPENDECTOMY      BREAST LUMPECTOMY Left 10/13/14    left lumpectomy with needle localization    BREAST SURGERY      2001 lumpectomy right breast    ILEOSTOMY OR JEJUNOSTOMY Right 1966    colon surgery\"removed most of the large and part of small intestine\"'removed appendix at that time    JOINT REPLACEMENT Right     hip    [address]    \"removed a tumor from the left lung\"    RHINOPLASTY      TONSILLECTOMY  as a child     Family History   Problem Relation Age of Onset    High Blood Pressure Mother     Cancer Father         liver cancer    Breast Cancer Maternal Aunt      Social History     Socioeconomic History    Marital status:       Spouse name: Not on file    Number of children: Not on file    Years of education: Not on file    Highest education level: Not on file   Occupational History    Not on file   Tobacco Use    Smoking status: Never    Smokeless tobacco: Never   Vaping Use    Vaping Use: Never used   Substance and Sexual Activity    Alcohol use: Not Currently     Comment: average 3-4 times per year    Drug use: No    Sexual activity: Not on file   Other Topics Concern    Not on file   Social History Narrative    Not on file     Social Determinants of Health     Financial Resource Strain: Low Risk     Difficulty of Paying Living Expenses: Not very hard   Food Insecurity: No Food Insecurity    Worried About Running Out of Food in the Last Year: Never true    Ran Out of Food in the Last Year: Never true   Transportation Needs: No Transportation Needs    Lack of Transportation (Medical): No    Lack of Transportation (Non-Medical): No   Physical Activity: Insufficiently Active    Days of Exercise per Week: 3 days    Minutes of Exercise per Session: 10 min   Stress: No Stress Concern Present    Feeling of Stress : Only a little   Social Connections: Socially Isolated    Frequency of Communication with Friends and Family: Once a week    Frequency of Social Gatherings with Friends and Family: Once a week    Attends [employer] Services: Never    Active Member of Clubs or Organizations: No    Attends Club or Organization Meetings: Never    Marital Status:     Intimate Partner Violence: Not on file   Housing Stability: Low Risk     Unable to Pay for Housing in the Last Year: No    Number of Places Lived in the Last Year: 1    Unstable Housing in the Last Year: No       Allergies   Allergen Reactions    Codeine Nausea And Vomiting and Rash    Clarithromycin Nausea Only and Nausea And Vomiting    Meperidine      \"room spins\"  \"room spins\"  \"room spins\"    Sulfa Antibiotics Rash and Nausea Only     Current Outpatient Medications   Medication Sig Dispense Refill    KLOR-CON M20 20 MEQ extended release tablet TAKE 1 TABLET DAILY 90 tablet 1    anastrozole (ARIMIDEX) 1 MG tablet TAKE ONE TABLET BY MOUTH ONCE DAILY 90 tablet 3    alendronate (FOSAMAX) 70 MG tablet TAKE 1 TABLET EVERY 7 DAYS 12 tablet 3    spironolactone (ALDACTONE) 25 MG tablet Take 1 tablet by mouth daily 90 tablet 3    venlafaxine (EFFEXOR XR) 37.5 MG extended release capsule Take 1 capsule by mouth daily 90 capsule 3    metoprolol succinate (TOPROL XL) 100 MG extended release tablet Take 1 tablet by mouth daily 90 tablet 3    amLODIPine (NORVASC) 10 MG tablet Take 1 tablet by mouth daily 90 tablet 3    atorvastatin (LIPITOR) 10 MG tablet TAKE 1 TABLET DAILY 90 tablet 3    Levothyroxine Sodium 88 MCG CAPS Take 88 mcg by mouth Daily       albuterol sulfate HFA (PROVENTIL HFA) 108 (90 Base) MCG/ACT inhaler Inhale 2 puffs into the lungs      calcium-vitamin D (OSCAL-500) 500-200 MG-UNIT per tablet Take 1 tablet by mouth daily. (Patient not taking: Reported on 8/17/2022)       No current facility-administered medications for this visit. Review of Systems   Constitutional:  Negative for activity change, appetite change, chills, fatigue and fever. HENT:  Negative for congestion, postnasal drip, sinus pressure and sore throat. Respiratory:  Negative for cough, chest tightness, shortness of breath and wheezing. Cardiovascular:  Negative for chest pain and leg swelling. Gastrointestinal:  Negative for abdominal pain, constipation and diarrhea. Genitourinary:  Negative for dysuria and frequency. Musculoskeletal:  Negative for back pain. Skin:  Negative for rash. Neurological:  Negative for dizziness, weakness and headaches. Psychiatric/Behavioral:  Negative for agitation and sleep disturbance. The patient is not nervous/anxious.       Lab Results   Component Value Date    WBC 6.2 03/02/2021    HGB 13.0 03/02/2021    HCT 43.6 03/02/2021    MCV 89.2 08/09/2022     03/02/2021     Lab Results   Component Value Date     08/09/2022    K 4.3 08/09/2022     08/09/2022    CO2 25 08/09/2022    BUN 26 (A) 08/09/2022    CREATININE 1.2 (H) 03/01/2021    GLUCOSE 112 (A) 08/09/2022    CALCIUM 9.9 08/09/2022    PROT 7.1 03/01/2021    LABALBU 4.7 03/01/2021    BILITOT 0.4 03/01/2021    ALKPHOS 62 03/01/2021    AST 35 03/01/2021    ALT 24 03/01/2021    LABGLOM 44 08/09/2022    LABGLOM 37 08/09/2022    GFRAA 53 (L) 03/01/2021     Lab Results   Component Value Date    CHOL 209 (H) 02/09/2022    CHOL 256 (H) 03/01/2021     Lab Results   Component Value Date    TRIG 73 02/09/2022    TRIG 93 03/01/2021     Lab Results   Component Value Date    HDL 92 (H) 02/09/2022    HDL 87 03/01/2021     Lab Results   Component Value Date    LDLCALC 102 (H) 02/09/2022     Lab Results   Component Value Date    LABA1C 5.8 03/01/2021     Lab Results   Component Value Date    TSH 1.510 09/07/2021    TSHHS 0.039 (L) 03/01/2021         BP (!) 145/80   Pulse 68   Ht 5' (1.524 m)   Wt 111 lb (50.3 kg)   SpO2 98%   BMI 21.68 kg/m²     BP Readings from Last 3 Encounters:   08/17/22 (!) 145/80   02/09/22 (!) 140/84   12/14/21 135/83       Wt Readings from Last 3 Encounters:   08/17/22 111 lb (50.3 kg)   05/20/22 108 lb (49 kg)   02/09/22 113 lb 6.4 oz (51.4 kg)         Physical Exam  Constitutional:       General: She is not in acute distress. Appearance: She is well-developed. She is not diaphoretic. HENT:      Head: Normocephalic and atraumatic. Eyes:      General: No scleral icterus. Pupils: Pupils are equal, round, and reactive to light. Cardiovascular:      Rate and Rhythm: Normal rate and regular rhythm. Heart sounds: Normal heart sounds. No murmur heard. Pulmonary:      Effort: Pulmonary effort is normal.      Breath sounds: Normal breath sounds. No wheezing. Abdominal:      General: There is no distension. Palpations: Abdomen is soft. There is no mass. Tenderness: There is no abdominal tenderness. Musculoskeletal:         General: Normal range of motion. Cervical back: Normal range of motion and neck supple.       Right lower leg: No edema. Left lower leg: No edema. Neurological:      General: No focal deficit present. Mental Status: She is alert and oriented to person, place, and time. Psychiatric:         Mood and Affect: Mood normal.         Behavior: Behavior normal.       ASSESSMENT/ PLAN:    1. Essential hypertension  -stable, on aldacton, metoprolol, and norvasc    2. Acquired hypothyroidism  - stable, TSH 1.51 on 9/21    3. Hyperlipidemia, unspecified hyperlipidemia type  - stable, on lipitor, last  on 2/22    4. Major depressive disorder with single episode, in [facility]  - stable, on effexor             - All old blood work reviewed with the patient  - Appropriate prescription are addressed. - After visit summery provided. - Questions answered and patient verbalizes understanding.  - Call for any problem, questions, or concerns.  - RTC if symptoms worse. Return in about 6 months (around 2/17/2023).

## 2022-08-20 ASSESSMENT — ENCOUNTER SYMPTOMS
SHORTNESS OF BREATH: 0
BACK PAIN: 0
WHEEZING: 0
CONSTIPATION: 0
DIARRHEA: 0
CHEST TIGHTNESS: 0
COUGH: 0
SORE THROAT: 0
SINUS PRESSURE: 0
ABDOMINAL PAIN: 0

## 2022-08-29 ENCOUNTER — OFFICE VISIT (OUTPATIENT)
Dept: FAMILY MEDICINE CLINIC | Age: 77
End: 2022-08-29
Payer: MEDICARE

## 2022-08-29 ENCOUNTER — HOSPITAL ENCOUNTER (OUTPATIENT)
Age: 77
Setting detail: SPECIMEN
Discharge: HOME | End: 2022-08-29
Payer: MEDICARE

## 2022-08-29 VITALS
BODY MASS INDEX: 21.64 KG/M2 | TEMPERATURE: 97.3 F | HEART RATE: 91 BPM | WEIGHT: 110.2 LBS | OXYGEN SATURATION: 95 % | HEIGHT: 60 IN

## 2022-08-29 DIAGNOSIS — Z20.822 CLOSE EXPOSURE TO COVID-19 VIRUS: ICD-10-CM

## 2022-08-29 DIAGNOSIS — R05.9 COUGH: Primary | ICD-10-CM

## 2022-08-29 PROCEDURE — U0003 INFECTIOUS AGENT DETECTION BY NUCLEIC ACID (DNA OR RNA); SEVERE ACUTE RESPIRATORY SYNDROME CORONAVIRUS 2 (SARS-COV-2) (CORONAVIRUS DISEASE [COVID-19]), AMPLIFIED PROBE TECHNIQUE, MAKING USE OF HIGH THROUGHPUT TECHNOLOGIES AS DESCRIBED BY CMS-2020-01-R: HCPCS

## 2022-08-29 PROCEDURE — 99213 OFFICE O/P EST LOW 20 MIN: CPT | Performed by: NURSE PRACTITIONER

## 2022-08-29 PROCEDURE — 1123F ACP DISCUSS/DSCN MKR DOCD: CPT | Performed by: NURSE PRACTITIONER

## 2022-08-29 PROCEDURE — U0005 INFEC AGEN DETEC AMPLI PROBE: HCPCS

## 2022-08-29 NOTE — PROGRESS NOTES
8/29/22  Helen Engel  1945    FLU/COVID-19 CLINIC EVALUATION    HPI SYMPTOMS:    Employer:Retired    [] Fevers  [x] Chills  [x] Cough  [] Coughing up blood  [] Chest Congestion  [x] Nasal Congestion  [] Feeling short of breath  [] Sometimes  [] Frequently  [] All the time  [] Chest pain  [] Headaches  []Tolerable  [] Severe  [] Sore throat  [] Muscle aches  [] Nausea  [] Vomiting  []Unable to keep fluids down  [] Diarrhea  []Severe    [] OTHER SYMPTOMS:      Symptom Duration:   [] 1  [x] 2   [] 3   [] 4    [] 5   [] 6   [] 7   [] 8   [] 9   [] 10   [] 11   [] 12   [] 13   [] 14   [] Longer than 14 days    Symptom course:   [] Worsening     [x] Stable     [] Improving    RISK FACTORS:    [] Pregnant or possibly pregnant  [x] Age over 61  [] Diabetes  [] Heart disease  [] Asthma  [] COPD/Other chronic lung diseases  [] Active Cancer  [] On Chemotherapy  [] Taking oral steroids  [] History Lymphoma/Leukemia  [x] Close contact with a lab confirmed COVID-19 patient within 14 days of symptom onset- Acquaintence  [] History of travel from affected geographical areas within 14 days of symptom onset       VITALS:  There were no vitals filed for this visit. TESTS:    POCT FLU:  [] Positive     []Negative    ASSESSMENT:    [] Flu  [] Possible COVID-19  [] Strep    PLAN:    [] Discharge home with written instructions for:  [] Flu management  [] Possible COVID-19 infection with self-quarantine and management of symptoms  [] Follow-up with primary care physician or emergency department if worsens  [] Evaluation per physician/NP/PA in clinic  [] Sent to ER       An  electronic signature was used to authenticate this note.      --Rosario Garrett MA on 8/29/2022 at 11:24 AM

## 2022-08-29 NOTE — PATIENT INSTRUCTIONS
Your COVID 19 test can take 1-5 days for the results to come back. We ask that you make a Mychart page and view your test results this way. You will need to Self quarantine until you know your results. If positive, please work on contact tracing. Increase fluids and rest  Monitor temperature twice a day  Tylenol as needed for fevers and/or discomfort. Big deep breaths periodically throughout the day  Regular Mucinex over the counter as needed for chest congestion  If symptoms worsen -Go to the ER. Follow up with your primary care provider      To Whom it May Concern:    Deborah Shipley was tested for COVID-19 8/29/2022. He/she must stay home until test results are back. If test is positive, isolate for a total of 5 days, starting from day 1 of symptom onset. After 5 days, if fever-free for 24 hours and there has been a gradual improvement in symptoms, may come out of isolation, but must consistently wear a mask when around other people for 5 additional days. (5 days isolation, 5 days mask-wearing). We do not recommend retesting as patients may continue to test positive for months even though no longer contagious. It is suggested you call [phone] or  [facility] with any questions regarding isolation timeframe/return to work/school details.

## 2022-08-29 NOTE — PROGRESS NOTES
8/29/2022    HPI:  Chief complaint and history of present illness as per medical assistant/nurse documented today in the Flu/COVID-19 clinic. Patient is here with complaints of cough and some chest congestion x 2 days. Patient states she was in close contact with an acquaintance that recently tested positive for covid. Patient states she has had her covid vaccine. MEDICATIONS:  Prior to Visit Medications    Medication Sig Taking? Authorizing Provider   KLOR-CON M20 20 MEQ extended release tablet TAKE 1 TABLET DAILY  Mau Andino MD   anastrozole (ARIMIDEX) 1 MG tablet TAKE ONE TABLET BY MOUTH ONCE DAILY  BLANCA Falcon - CNP   alendronate (FOSAMAX) 70 MG tablet TAKE 1 TABLET EVERY 7 DAYS  Mau Andino MD   spironolactone (ALDACTONE) 25 MG tablet Take 1 tablet by mouth daily  Mau Andino MD   venlafaxine (EFFEXOR XR) 37.5 MG extended release capsule Take 1 capsule by mouth daily  Mau Andino MD   metoprolol succinate (TOPROL XL) 100 MG extended release tablet Take 1 tablet by mouth daily  Mau Andino MD   amLODIPine (NORVASC) 10 MG tablet Take 1 tablet by mouth daily  Mau Andino MD   atorvastatin (LIPITOR) 10 MG tablet TAKE 1 TABLET DAILY  Mau Andino MD   albuterol sulfate HFA (PROVENTIL HFA) 108 (90 Base) MCG/ACT inhaler Inhale 2 puffs into the lungs  Historical Provider, MD   Levothyroxine Sodium 88 MCG CAPS Take 88 mcg by mouth Daily   Historical Provider, MD   calcium-vitamin D (OSCAL-500) 500-200 MG-UNIT per tablet Take 1 tablet by mouth daily.   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 Lluvia Lima at [facility] [place]    History of therapeutic radiation     Hx antineoplastic chemo     Hypertension     Ileostomy care ([facility] [address]     Nausea & vomiting     \"got sick with one surgery\"    Thyroid ca [facility])     had thyroid cancer in 2005- tx with radioactive iodine and radiation    Thyroid disease     hypo    Ulcerative colitis ([facility] [address]    ,   Past Surgical History:   Procedure Laterality Date    APPENDECTOMY      BREAST LUMPECTOMY Left 10/13/14    left lumpectomy with needle localization    BREAST SURGERY      2001 lumpectomy right breast    ILEOSTOMY OR JEJUNOSTOMY Right 1966    colon surgery\"removed most of the large and part of small intestine\"'removed appendix at that time    JOINT REPLACEMENT Right     hip    [address]    \"removed a tumor from the left lung\"    RHINOPLASTY      TONSILLECTOMY  as a child   ,   Social History     Tobacco Use    Smoking status: Never    Smokeless tobacco: Never   Vaping Use    Vaping Use: Never used   Substance Use Topics    Alcohol use: Not Currently     Comment: average 3-4 times per year    Drug use: No   ,   Family History   Problem Relation Age of Onset    High Blood Pressure Mother     Cancer Father         liver cancer    Breast Cancer Maternal Aunt    ,   Immunization History   Administered Date(s) Administered    COVID-19, MODERNA BLUE border, Primary or Immunocompromised, (age 12y+), IM, 100 mcg/0.5mL 02/03/2021, 03/03/2021, 10/25/2021    Influenza, FLUAD, (age 72 y+), Adjuvanted 09/11/2020    Influenza, FLUARIX, FLULAVAL, (age 10 mo+) AND AFLURIA, FLUZONE (age 1 y+), PF 09/26/2018    Influenza, High Dose (Fluzone 65 yrs and older) 09/22/2015, 10/10/2016    Influenza, Triv, inactivated, subunit, adjuvanted, IM (Fluad 65 yrs and older) 10/16/2019    Pneumococcal Conjugate 13-valent (Balinda ) 02/11/2016    Zoster Live (Zostavax) 02/13/2015   , Health Maintenance   Topic Date Due    Hepatitis C screen  Never done    DTaP/Tdap/Td vaccine (1 - Tdap) Never done    Shingles vaccine (2 of 3) 04/10/2015    COVID-19 Vaccine (4 - Booster for Moderna series) 02/25/2022    A1C test (Diabetic or Prediabetic)  03/01/2022    Flu vaccine (1) 09/01/2022    Lipids  02/09/2023    Depression Monitoring  08/17/2023    DEXA (modify frequency per FRAX score)  Completed    Pneumococcal 65+ years Vaccine  Completed    Hepatitis A vaccine  Aged Out    Hepatitis B vaccine  Aged Out    Hib vaccine  Aged Out    Meningococcal (ACWY) vaccine  Aged Out       PHYSICAL EXAM:  Physical Exam  Constitutional:       Appearance: Normal appearance. HENT:      Head: Normocephalic. Right Ear: Tympanic membrane, ear canal and external ear normal.      Left Ear: Tympanic membrane, ear canal and external ear normal.      Nose: Nose normal.      Mouth/Throat:      Lips: Pink. Mouth: Mucous membranes are moist.      Pharynx: Oropharynx is clear. Cardiovascular:      Rate and Rhythm: Normal rate and regular rhythm. Heart sounds: Normal heart sounds. Pulmonary:      Effort: Pulmonary effort is normal.      Breath sounds: Normal breath sounds. Musculoskeletal:      Cervical back: Neck supple. Skin:     General: Skin is warm and dry. Neurological:      Mental Status: She is alert and oriented to person, place, and time. Psychiatric:         Mood and Affect: Mood normal.         Behavior: Behavior normal.       ASSESSMENT/PLAN:  1. Cough  Discussed with patient if covid test is negative and no improvement in symptoms wound consider an antibiotic. Your COVID 19 test can take 1-5 days for the results to come back. We ask that you make a [employer] page and view your test results this way. You will need to Self quarantine until you know your results. If positive, please work on contact tracing.     Increase fluids and rest  Monitor temperature twice a day  Tylenol as needed for fevers and/or discomfort. Big deep breaths periodically throughout the day  Regular Mucinex over the counter as needed for chest congestion  If symptoms worsen -Go to the ER. Follow up with your primary care provider  - Covid-19 Ambulatory    2. Close concern to COVID-19 virus  - Covid-19 Ambulatory    FOLLOW-UP:  Return if symptoms worsen or fail to improve.     In addition to other information, the printed after visit summary provided to the patient includes:  [x] COVID-19 Self care instructions  [x] COVID-19 General patient information

## 2022-08-30 LAB
SARS-COV-2: NOT DETECTED
SOURCE: NORMAL

## 2022-08-30 RX ORDER — DOXYCYCLINE HYCLATE 100 MG
100 TABLET ORAL 2 TIMES DAILY
Qty: 20 TABLET | Refills: 0 | Status: SHIPPED | OUTPATIENT
Start: 2022-08-30 | End: 2022-09-09

## 2022-09-21 ENCOUNTER — TELEPHONE (OUTPATIENT)
Dept: SURGERY | Age: 77
End: 2022-09-21

## 2022-09-21 DIAGNOSIS — Z12.31 ENCOUNTER FOR SCREENING MAMMOGRAM FOR MALIGNANT NEOPLASM OF BREAST: ICD-10-CM

## 2022-09-21 DIAGNOSIS — Z12.39 ENCOUNTER FOR SCREENING BREAST EXAMINATION: Primary | ICD-10-CM

## 2022-09-28 ENCOUNTER — TELEPHONE (OUTPATIENT)
Dept: FAMILY MEDICINE CLINIC | Age: 77
End: 2022-09-28

## 2022-09-28 DIAGNOSIS — Z78.0 MENOPAUSE: ICD-10-CM

## 2022-09-28 DIAGNOSIS — M81.0 OSTEOPOROSIS WITHOUT CURRENT PATHOLOGICAL FRACTURE, UNSPECIFIED OSTEOPOROSIS TYPE: Primary | ICD-10-CM

## 2022-09-30 NOTE — TELEPHONE ENCOUNTER
Called patient and told her that she needed to contact her insurance company because when referral was put in it stated that it was not covered by insurance. Also, she was informed that her last dexa scan was done on 10/28/2021 so she would have to wait until after that date to get it done.

## 2022-11-22 ENCOUNTER — OFFICE VISIT (OUTPATIENT)
Dept: FAMILY MEDICINE CLINIC | Age: 77
End: 2022-11-22
Payer: MEDICARE

## 2022-11-22 VITALS
BODY MASS INDEX: 22.07 KG/M2 | DIASTOLIC BLOOD PRESSURE: 82 MMHG | WEIGHT: 112.4 LBS | SYSTOLIC BLOOD PRESSURE: 132 MMHG | HEIGHT: 60 IN | HEART RATE: 87 BPM | OXYGEN SATURATION: 98 %

## 2022-11-22 DIAGNOSIS — J18.9 PNEUMONIA OF RIGHT LOWER LOBE DUE TO INFECTIOUS ORGANISM: Primary | ICD-10-CM

## 2022-11-22 DIAGNOSIS — Z09 FOLLOW-UP EXAM: ICD-10-CM

## 2022-11-22 DIAGNOSIS — R82.90 URINE ABNORMALITY: ICD-10-CM

## 2022-11-22 LAB
BILIRUBIN, POC: NORMAL
BLOOD URINE, POC: NORMAL
CLARITY, POC: CLEAR
COLOR, POC: YELLOW
GLUCOSE URINE, POC: NORMAL
KETONES, POC: NORMAL
LEUKOCYTE EST, POC: NORMAL
NITRITE, POC: NORMAL
PH, POC: 5.5
PROTEIN, POC: NORMAL
SPECIFIC GRAVITY, POC: 1.02
UROBILINOGEN, POC: NORMAL

## 2022-11-22 PROCEDURE — 3078F DIAST BP <80 MM HG: CPT | Performed by: FAMILY MEDICINE

## 2022-11-22 PROCEDURE — 1123F ACP DISCUSS/DSCN MKR DOCD: CPT | Performed by: FAMILY MEDICINE

## 2022-11-22 PROCEDURE — 3074F SYST BP LT 130 MM HG: CPT | Performed by: FAMILY MEDICINE

## 2022-11-22 PROCEDURE — 81002 URINALYSIS NONAUTO W/O SCOPE: CPT | Performed by: FAMILY MEDICINE

## 2022-11-22 PROCEDURE — 99214 OFFICE O/P EST MOD 30 MIN: CPT | Performed by: FAMILY MEDICINE

## 2022-11-22 ASSESSMENT — ENCOUNTER SYMPTOMS
CHEST TIGHTNESS: 0
NAUSEA: 0
COUGH: 0
DIARRHEA: 0
VOMITING: 0
SHORTNESS OF BREATH: 0
BACK PAIN: 0
CONSTIPATION: 0
ABDOMINAL PAIN: 0
WHEEZING: 0

## 2022-11-22 NOTE — PROGRESS NOTES
Miranda Seymour  1945 11/22/22    Chief Complaint   Patient presents with    Follow-up     Follow-up of from the ED for right lower lobe pneumonia and complaining of frequent urination           Patient here for follow-up of from the ED for cough and shortness of breath she had a CT did show right lower lobe pneumonia give her antibiotic she finished antibiotic and now she is feeling much better, no cough no shortness of breath, but she stats has some urination problem and just wanted make sure she does not have urinary tract infection, no abdominal pain just the frequency, no fever no nausea no vomiting      Past Medical History:   Diagnosis Date    Aspiration pneumonia ([address]     Breast cancer ([facility] [address]     Cancer ([facility] [address]     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 Teresa Ward at [facility] [place]    History of therapeutic radiation     Hx antineoplastic chemo     Hypertension     Ileostomy care ([facility] [address]     Nausea & vomiting     \"got sick with one surgery\"    Thyroid ca [facility])     had thyroid cancer in 2005- tx with radioactive iodine and radiation    Thyroid disease     hypo    Ulcerative colitis ([address]      Past Surgical History:   Procedure Laterality Date    APPENDECTOMY      BREAST LUMPECTOMY Left 10/13/14    left lumpectomy with needle localization    BREAST SURGERY      2001 lumpectomy right breast    ILEOSTOMY OR JEJUNOSTOMY Right 1966    colon surgery\"removed most of the large and part of small intestine\"'removed appendix at that time    JOINT REPLACEMENT Right     hip    [address]    \"removed a tumor from the left lung\"    RHINOPLASTY      TONSILLECTOMY  as a child     Family History   Problem Relation Age of Onset    High Blood Pressure Mother     Cancer Father         liver cancer    Breast Cancer Maternal Aunt      Social History     Socioeconomic History    Marital status:       Spouse name: Not on file    Number of children: Not on file    Years of education: Not on file    Highest education level: Not on file   Occupational History    Not on file   Tobacco Use    Smoking status: Never    Smokeless tobacco: Never   Vaping Use    Vaping Use: Never used   Substance and Sexual Activity    Alcohol use: Not Currently     Comment: average 3-4 times per year    Drug use: No    Sexual activity: Not on file   Other Topics Concern    Not on file   Social History Narrative    Not on file     Social Determinants of Health     Financial Resource Strain: Low Risk     Difficulty of Paying Living Expenses: Not very hard   Food Insecurity: No Food Insecurity    Worried About Running Out of Food in the Last Year: Never true    Ran Out of Food in the Last Year: Never true   Transportation Needs: No Transportation Needs    Lack of Transportation (Medical): No    Lack of Transportation (Non-Medical): No   Physical Activity: Insufficiently Active    Days of Exercise per Week: 3 days    Minutes of Exercise per Session: 10 min   Stress: No Stress Concern Present    Feeling of Stress : Only a little   Social Connections: Socially Isolated    Frequency of Communication with Friends and Family: Once a week    Frequency of Social Gatherings with Friends and Family: Once a week    Attends [employer] Services: Never    Active Member of Clubs or Organizations: No    Attends Club or Organization Meetings: Never    Marital Status:     Intimate Partner Violence: Not on file   Housing Stability: Low Risk     Unable to Pay for Housing in the Last Year: No    Number of Places Lived in the Last Year: 1    Unstable Housing in the Last Year: No       Allergies   Allergen Reactions    Codeine Nausea And Vomiting and Rash    Clarithromycin Nausea Only and Nausea And Vomiting    Meperidine      \"room spins\"  \"room spins\"  \"room spins\"    Sulfa Antibiotics Rash and Nausea Only     Current Outpatient Medications   Medication Sig Dispense Refill KLOR-CON M20 20 MEQ extended release tablet TAKE 1 TABLET DAILY 90 tablet 1    anastrozole (ARIMIDEX) 1 MG tablet TAKE ONE TABLET BY MOUTH ONCE DAILY 90 tablet 3    alendronate (FOSAMAX) 70 MG tablet TAKE 1 TABLET EVERY 7 DAYS 12 tablet 3    spironolactone (ALDACTONE) 25 MG tablet Take 1 tablet by mouth daily 90 tablet 3    venlafaxine (EFFEXOR XR) 37.5 MG extended release capsule Take 1 capsule by mouth daily 90 capsule 3    metoprolol succinate (TOPROL XL) 100 MG extended release tablet Take 1 tablet by mouth daily 90 tablet 3    amLODIPine (NORVASC) 10 MG tablet Take 1 tablet by mouth daily 90 tablet 3    atorvastatin (LIPITOR) 10 MG tablet TAKE 1 TABLET DAILY 90 tablet 3    Levothyroxine Sodium 88 MCG CAPS Take 88 mcg by mouth Daily       calcium-vitamin D (OSCAL-500) 500-200 MG-UNIT per tablet Take 1 tablet by mouth daily      albuterol sulfate HFA (PROVENTIL HFA) 108 (90 Base) MCG/ACT inhaler Inhale 2 puffs into the lungs       No current facility-administered medications for this visit. Review of Systems   Constitutional:  Negative for activity change, appetite change, chills, fatigue and fever. HENT:  Negative for congestion. Respiratory:  Negative for cough, chest tightness, shortness of breath and wheezing. Cardiovascular:  Negative for chest pain and leg swelling. Gastrointestinal:  Negative for abdominal pain, constipation, diarrhea, nausea and vomiting. Genitourinary:  Positive for frequency. Negative for dysuria and flank pain. Musculoskeletal:  Negative for back pain. Neurological:  Negative for dizziness and headaches. Psychiatric/Behavioral:  Negative for agitation and sleep disturbance. The patient is not nervous/anxious.       Lab Results   Component Value Date    WBC 6.2 03/02/2021    HGB 13.0 03/02/2021    HCT 43.6 03/02/2021    MCV 89.2 08/09/2022     03/02/2021     Lab Results   Component Value Date     08/09/2022    K 4.3 08/09/2022     08/09/2022 CO2 25 08/09/2022    BUN 26 (A) 08/09/2022    CREATININE 1.2 (H) 03/01/2021    GLUCOSE 112 (A) 08/09/2022    CALCIUM 9.9 08/09/2022    PROT 7.1 03/01/2021    LABALBU 4.7 03/01/2021    BILITOT 0.4 03/01/2021    ALKPHOS 62 03/01/2021    AST 35 03/01/2021    ALT 24 03/01/2021    LABGLOM 44 08/09/2022    LABGLOM 37 08/09/2022    GFRAA 53 (L) 03/01/2021     Lab Results   Component Value Date    CHOL 209 (H) 02/09/2022    CHOL 256 (H) 03/01/2021     Lab Results   Component Value Date    TRIG 73 02/09/2022    TRIG 93 03/01/2021     Lab Results   Component Value Date    HDL 92 (H) 02/09/2022    HDL 87 03/01/2021     Lab Results   Component Value Date    LDLCALC 102 (H) 02/09/2022     Lab Results   Component Value Date    LABA1C 5.8 03/01/2021     Lab Results   Component Value Date    TSH 1.510 09/07/2021    TSHHS 0.039 (L) 03/01/2021         /82 (Site: Right Upper Arm, Position: Sitting, Cuff Size: Medium Adult)   Pulse 87   Ht 5' (1.524 m)   Wt 112 lb 6.4 oz (51 kg)   SpO2 98%   BMI 21.95 kg/m²     BP Readings from Last 3 Encounters:   11/22/22 132/82   08/17/22 (!) 145/80   02/09/22 (!) 140/84       Wt Readings from Last 3 Encounters:   11/22/22 112 lb 6.4 oz (51 kg)   08/29/22 110 lb 3.2 oz (50 kg)   08/17/22 111 lb (50.3 kg)         Physical Exam  Constitutional:       General: She is not in acute distress. Appearance: Normal appearance. She is well-developed. She is not ill-appearing or diaphoretic. HENT:      Head: Normocephalic and atraumatic. Eyes:      General: No scleral icterus. Pupils: Pupils are equal, round, and reactive to light. Cardiovascular:      Rate and Rhythm: Normal rate and regular rhythm. Heart sounds: Normal heart sounds. No murmur heard. Pulmonary:      Effort: Pulmonary effort is normal.      Breath sounds: Normal breath sounds. No wheezing. Abdominal:      General: There is no distension. Palpations: Abdomen is soft. There is no mass. Tenderness:  There is no abdominal tenderness. Musculoskeletal:         General: Normal range of motion. Cervical back: Normal range of motion and neck supple. No rigidity. Right lower leg: No edema. Left lower leg: No edema. Neurological:      General: No focal deficit present. Mental Status: She is alert and oriented to person, place, and time. Psychiatric:         Mood and Affect: Mood normal.         Behavior: Behavior normal.       ASSESSMENT/ PLAN:    1. Follow-up exam  Follow-up of from the ED visit    2. Pneumonia of right lower lobe due to infectious organism  -Improved she finished antibiotic  CT :  1. No evidence of acute pulmonary embolism. 2.  Findings consistent with basilar right lower lobe pneumonia. 3.  Redemonstration of multiple noncalcified pulmonary nodules in the right middle and left lower lobes measuring up to 9 mm. These are unchanged in size since 4/18/2022 but do measure larger when compared to 12/30/2021. Follow-up per Fleischner guidelines from study on 4/18/2022, included below. Patient for her lung nodules already follow-up with the pulmonologist in [place]  3. Urine abnormality  - POCT Urinalysis no Micro  -Normal range          - All old blood work reviewed with the patient  - Appropriate prescription are addressed. - After visit summery provided. - Questions answered and patient verbalizes understanding.  - Call for any problem, questions, or concerns. Return if symptoms worsen or fail to improve.

## 2022-12-08 NOTE — TELEPHONE ENCOUNTER
Patient informed. Take antibiotic as instructed  Tylenol/motrin for pain or fever  Drink plenty of fluids and rest - try to avoid milk/dairy as it may thicken mucus  May try over the counter children's robitussin or delsym for cough  Follow up with PCP in 3-5 days  Proceed to  ER if symptoms worsen  Acute Cough in Children   WHAT YOU NEED TO KNOW:   An acute cough can last up to 3 weeks  Common causes of an acute cough include a cold, allergies, or a lung infection  DISCHARGE INSTRUCTIONS:   Call your local emergency number (911 in the [address] [place] [address],[address]) for any of the following: Your child has trouble breathing  Your child coughs up blood, or you see blood in his or her mucus  Your child faints  Call your child's healthcare provider if:   Your child's lips or fingernails turn dark or blue  Your child is wheezing  Your child is breathing fast:    More than 60 breaths in 1 minute for infants up to 3months of age    More than 50 breaths in 1 minute for infants 2 months to 1 year of age    More than 40 breaths in 1 minute for a child 1 year or older    The skin between your child's ribs or around his or her neck goes in with every breath  Your child's cough gets worse, or it sounds like a barking cough  Your child has a fever  Your child's cough lasts longer than 5 days  Your child's cough does not get better with treatment  You have questions or concerns about your child's condition or care  Medicines:   Medicines  may be given to stop the cough, decrease swelling in your child's airways, or help open his or her airways  Medicine may also be given to help your child cough up mucus  If your child has an infection caused by bacteria, he or she may need antibiotics  Do not  give cough and cold medicine to a child younger than 4 years  Talk to your healthcare provider before you give cold and cough medicine to a child older than 4 years  Give your child's medicine as directed    Contact your child's healthcare provider if you think the medicine is not working as expected  Tell him or her if your child is allergic to any medicine  Keep a current list of the medicines, vitamins, and herbs your child takes  Include the amounts, and when, how, and why they are taken  Bring the list or the medicines in their containers to follow-up visits  Carry your child's medicine list with you in case of an emergency  Manage your child's cough:   Keep your child away from others who are smoking  Nicotine and other chemicals in cigarettes and cigars can make your child's cough worse  Give your child extra liquids as directed  Liquids will help thin and loosen mucus so your child can cough it up  Liquids will also help prevent dehydration  Examples of liquids to give your child include water, fruit juice, and broth  Do not give your child liquids that contain caffeine  Caffeine can increase your child's risk for dehydration  Ask your child's healthcare provider how much liquid he or she should drink each day  Have your child rest as directed  Do not let your child do activities that make his or her cough worse, such as exercise  Use a humidifier or vaporizer  Use a cool mist humidifier or a vaporizer to increase air moisture in your home  This may make it easier for your child to breathe and help decrease his or her cough  Give your child honey as directed  Honey can help thin mucus and decrease your child's cough  Do not give honey to children younger than 1 year  Give ½ teaspoon of honey to children 3to 11years of age  Give 1 teaspoon of honey to children 10to 6years of age  Give 2 teaspoons of honey to children 15years of age or older  If you give your child honey at bedtime, brush his or her teeth after  Give your child a cough drop or lozenge if he or she is 4 years or older  These can help decrease throat irritation and your child's cough      Follow up with your child's healthcare provider as directed:  Write down your questions so you remember to ask them during your visits  © Copyright [employer] 2022 Information is for End User's use only and may not be sold, redistributed or otherwise used for commercial purposes  All illustrations and images included in CareNotes® are the copyrighted property of A D A M , Inc  or Dottie Hoover  The above information is an  only  It is not intended as medical advice for individual conditions or treatments  Talk to your doctor, nurse or pharmacist before following any medical regimen to see if it is safe and effective for you  Sore Throat in Children   WHAT YOU NEED TO KNOW:   Treatment of your child's sore throat may depend on the condition that caused it  You can do several things at home to help decrease your child's sore throat  DISCHARGE INSTRUCTIONS:   Call 911 for any of the following: Your child has trouble breathing  Your child is breathing with his or her mouth open and tongue out  Your child is sitting up and leaning forward to help him or her breathe  Your child's breathing sounds harsh and raspy  Your child is drooling and cannot swallow  Return to the emergency department if:   You can see blisters, pus, or white spots in your child's mouth or on his or her throat  Your child is restless  Your child has a rash or blisters on his or her skin  Your child's neck feels swollen  Your child has a stiff neck and a headache  Contact your child's healthcare provider if:   Your child has a fever or chills  Your child is weak or more tired than usual      Your child has trouble swallowing  Your child has bloody discharge from his or her nose or ear  Your child's sore throat does not get better within 1 week or gets worse  Your child has stomach pain, nausea, or is vomiting  You have questions or concerns about your child's condition or care  Medicines:   Your child may need any of the following:  Acetaminophen  decreases pain and fever  It is available without a doctor's order  Ask how much to give your child and how often to give it  Follow directions  Acetaminophen can cause liver damage if not taken correctly  NSAIDs , such as ibuprofen, help decrease swelling, pain, and fever  This medicine is available with or without a doctor's order  NSAIDs can cause stomach bleeding or kidney problems in certain people  If your child takes blood thinner medicine, always ask if NSAIDs are safe for him or her  Always read the medicine label and follow directions  Do not give these medicines to children under 10months of age without direction from your child's healthcare provider  Do not give aspirin to children under 25years of age  Your child could develop Reye syndrome if he takes aspirin  Reye syndrome can cause life-threatening brain and liver damage  Check your child's medicine labels for aspirin, salicylates, or oil of wintergreen  Give your child's medicine as directed  Contact your child's healthcare provider if you think the medicine is not working as expected  Tell him or her if your child is allergic to any medicine  Keep a current list of the medicines, vitamins, and herbs your child takes  Include the amounts, and when, how, and why they are taken  Bring the list or the medicines in their containers to follow-up visits  Carry your child's medicine list with you in case of an emergency  Care for your child:   Give your child plenty of liquids  Liquids will help soothe your child's throat  Ask your child's healthcare provider how much liquid to give your child each day  Give your child warm or frozen liquids  Warm liquids include hot chocolate, sweetened tea, or soups  Frozen liquids include ice pops  Do not give your child acidic drinks such as orange juice, grapefruit juice, or lemonade  Acidic drinks can make your child's throat pain worse       Have your child gargle with salt water  If your child can gargle, give him or her ¼ of a teaspoon of salt mixed with 1 cup of warm water  Tell your child to gargle for 10 to 15 seconds  Your child can repeat this up to 4 times each day  Give your child throat lozenges or hard candy to suck on  Lozenges and hard candy can help decrease throat pain  Do not give lozenges or hard candy to children under 4 years  Use a cool mist humidifier in your child's bedroom  A cool mist humidifier increases moisture in the air  This may decrease dryness and pain in your child's throat  Do not smoke near your child  Do not let your older child smoke  Nicotine and other chemicals in cigarettes and cigars can cause lung damage  They can also make your child's sore throat worse  Ask your healthcare provider for information if you or your child currently smoke and need help to quit  E-cigarettes or smokeless tobacco still contain nicotine  Talk to your healthcare provider before you or your child use these products  Follow up with your child's doctor as directed:  Write down your questions so you remember to ask them during your child's visits  © Copyright [employer] 2022 Information is for End User's use only and may not be sold, redistributed or otherwise used for commercial purposes  All illustrations and images included in CareNotes® are the copyrighted property of [employer]  or Dottie Hoover  The above information is an  only  It is not intended as medical advice for individual conditions or treatments  Talk to your doctor, nurse or pharmacist before following any medical regimen to see if it is safe and effective for you

## 2022-12-22 ENCOUNTER — HOSPITAL ENCOUNTER (OUTPATIENT)
Dept: WOMENS IMAGING | Age: 77
Discharge: HOME | End: 2022-12-22
Payer: MEDICARE

## 2022-12-22 DIAGNOSIS — Z12.39 ENCOUNTER FOR SCREENING BREAST EXAMINATION: ICD-10-CM

## 2022-12-22 DIAGNOSIS — Z78.0 MENOPAUSE: ICD-10-CM

## 2022-12-22 DIAGNOSIS — Z12.31 ENCOUNTER FOR SCREENING MAMMOGRAM FOR MALIGNANT NEOPLASM OF BREAST: ICD-10-CM

## 2022-12-22 PROCEDURE — 77080 DXA BONE DENSITY AXIAL: CPT

## 2022-12-22 PROCEDURE — 77063 BREAST TOMOSYNTHESIS BI: CPT

## 2023-01-06 DIAGNOSIS — E78.5 HYPERLIPIDEMIA, UNSPECIFIED HYPERLIPIDEMIA TYPE: ICD-10-CM

## 2023-01-07 RX ORDER — ATORVASTATIN CALCIUM 10 MG/1
TABLET, FILM COATED ORAL
Qty: 90 TABLET | Refills: 3 | Status: SHIPPED | OUTPATIENT
Start: 2023-01-07

## 2023-01-23 ENCOUNTER — OFFICE VISIT (OUTPATIENT)
Dept: SURGERY | Age: 78
End: 2023-01-23
Payer: MEDICARE

## 2023-01-23 VITALS
SYSTOLIC BLOOD PRESSURE: 118 MMHG | HEART RATE: 68 BPM | OXYGEN SATURATION: 100 % | DIASTOLIC BLOOD PRESSURE: 70 MMHG | BODY MASS INDEX: 21.4 KG/M2 | HEIGHT: 60 IN | WEIGHT: 109 LBS

## 2023-01-23 DIAGNOSIS — D05.12 DUCTAL CARCINOMA IN SITU (DCIS) OF LEFT BREAST: Primary | ICD-10-CM

## 2023-01-23 PROCEDURE — 1123F ACP DISCUSS/DSCN MKR DOCD: CPT | Performed by: NURSE PRACTITIONER

## 2023-01-23 PROCEDURE — 3074F SYST BP LT 130 MM HG: CPT | Performed by: NURSE PRACTITIONER

## 2023-01-23 PROCEDURE — 99214 OFFICE O/P EST MOD 30 MIN: CPT | Performed by: NURSE PRACTITIONER

## 2023-01-23 PROCEDURE — 3078F DIAST BP <80 MM HG: CPT | Performed by: NURSE PRACTITIONER

## 2023-01-23 RX ORDER — ANASTROZOLE 1 MG/1
TABLET ORAL
Qty: 90 TABLET | Refills: 3 | Status: SHIPPED | OUTPATIENT
Start: 2023-01-23

## 2023-01-23 ASSESSMENT — ENCOUNTER SYMPTOMS
WHEEZING: 0
SHORTNESS OF BREATH: 0
COLOR CHANGE: 0
ABDOMINAL PAIN: 0
VOMITING: 0
NAUSEA: 0

## 2023-01-23 NOTE — PROGRESS NOTES
GENERAL SURGERY NOTE - Outpatient Progress    PATIENT: Dania Trevizo 1945, 70 y.o., female   Date: 1/23/2023    MRN: [id number]   Requesting Provider:   No ref. provider found History Obtained From:  patient, electronic medical record     Reason for Evaluation & Chief Complaint:    Chief Complaint   Patient presents with    Follow-up     Mammo and dexa results 12/22/22       Gaerth Schuler is a 70 y.o. female who presents today for breast cancer follow up and states that she has been doing well. She denies palpating any masses in either breast, changes in the appearance of her breasts or the skin and denies bilateral nipple discharge. She denies weight loss, bone pain, headaches and has no other new systemic complaints. She does perform self breast exams. left  Breast.  Initial size 2 cm. Nodes resected 0.  Numbers positive: 0. Stage: TIS N0 M0. (Stage: IS)   Procedure: lumpectomy  Date: 10/13/2014   Hormonal Therapy:yes, still taking  Chemotherapy: no  Radiation Therapy: done  [facility] no  ONCOTYPE no  ER+/AL+/HER2+    HISTORY OF PRESENT ILLNESS:      Workup Includes:   Mammogram/US: yes  DEXA: yes  Sozo:    Breast History, General  Endocrine Therapy: yes  Oncotype/Genetic Testing: no  Chemotherapy: no  Estrogen/HRT:   Radiation/Environmental Exposure (eg mantle radiation):  Age of menarche: 17  Age at birth of first child: never pregnant  Family History of breast cancer: yes: maternal aunt    Left Breast History  Surgery, Date: Lumpectomy, 10/13/2014  Breast cancer: yes   Type, Stage: DCIS   Size: 2 cm   Receptor Status: ER/AL positive and HER2 positive   Node Status:    Radiation Therapy: yes     Right Breast History  Surgery, Date: lumpectomy in [facility] (25 years ago)   Breast cancer:    Type, Stage: DCIS   Size:   Receptor Status:    Node Status:    Radiation Therapy:     Past Medical History:    Past Medical History:   Diagnosis Date    Aspiration pneumonia ([facility] [address]     Breast cancer ([facility] [address] Cancer [facility])     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 Demond Green [facility] [place]    History of therapeutic radiation     Hx antineoplastic chemo     Hypertension     Ileostomy care ([facility] [address]     Nausea & vomiting     \"got sick with one surgery\"    Thyroid ca [facility])     had thyroid cancer in 2005- tx with radioactive iodine and radiation    Thyroid disease     hypo    Ulcerative colitis ([facility] [address]        Past Surgical History:    Past Surgical History:   Procedure Laterality Date    APPENDECTOMY      BREAST LUMPECTOMY Left 10/13/14    left lumpectomy with needle localization    BREAST SURGERY      2001 lumpectomy right breast    ILEOSTOMY OR JEJUNOSTOMY Right 1966    colon surgery\"removed most of the large and part of small intestine\"'removed appendix at that time    JOINT REPLACEMENT Right     hip    [address]    \"removed a tumor from the left lung\"    RHINOPLASTY      TONSILLECTOMY  as a child       Current Medications:   Current Outpatient Medications   Medication Sig Dispense Refill    anastrozole (ARIMIDEX) 1 MG tablet TAKE ONE TABLET BY MOUTH ONCE DAILY 90 tablet 3    atorvastatin (LIPITOR) 10 MG tablet TAKE 1 TABLET DAILY 90 tablet 3    KLOR-CON M20 20 MEQ extended release tablet TAKE 1 TABLET DAILY 90 tablet 1    alendronate (FOSAMAX) 70 MG tablet TAKE 1 TABLET EVERY 7 DAYS 12 tablet 3    spironolactone (ALDACTONE) 25 MG tablet Take 1 tablet by mouth daily 90 tablet 3    venlafaxine (EFFEXOR XR) 37.5 MG extended release capsule Take 1 capsule by mouth daily 90 capsule 3    metoprolol succinate (TOPROL XL) 100 MG extended release tablet Take 1 tablet by mouth daily 90 tablet 3    amLODIPine (NORVASC) 10 MG tablet Take 1 tablet by mouth daily 90 tablet 3    albuterol sulfate HFA (PROVENTIL HFA) 108 (90 Base) MCG/ACT inhaler Inhale 2 puffs into the lungs      Levothyroxine Sodium 88 MCG CAPS Take 88 mcg by mouth Daily       calcium-vitamin D (OSCAL-500) 500-200 MG-UNIT per tablet Take 1 tablet by mouth daily       No current facility-administered medications for this visit. Allergies:  Codeine, Clarithromycin, Meperidine, and Sulfa antibiotics    Social History:   Social History     Socioeconomic History    Marital status:      Spouse name: None    Number of children: None    Years of education: None    Highest education level: None   Tobacco Use    Smoking status: Never    Smokeless tobacco: Never   Vaping Use    Vaping Use: Never used   Substance and Sexual Activity    Alcohol use: Not Currently     Comment: average 3-4 times per year    Drug use: No     Social Determinants of Health     Financial Resource Strain: Low Risk     Difficulty of Paying Living Expenses: Not very hard   Food Insecurity: No Food Insecurity    Worried About Running Out of Food in the Last Year: Never true    Ran Out of Food in the Last Year: Never true   Transportation Needs: No Transportation Needs    Lack of Transportation (Medical): No    Lack of Transportation (Non-Medical): No   Physical Activity: Insufficiently Active    Days of Exercise per Week: 3 days    Minutes of Exercise per Session: 10 min   Stress: No Stress Concern Present    Feeling of Stress : Only a little   Social Connections: Socially Isolated    Frequency of Communication with Friends and Family: Once a week    Frequency of Social Gatherings with Friends and Family: Once a week    Attends Mandaen Services: Never    Active Member of Clubs or Organizations: No    Attends Club or Organization Meetings: Never    Marital Status:     Housing Stability: Low Risk     Unable to Pay for Housing in the Last Year: No    Number of Places Lived in the Last Year: 1    Unstable Housing in the Last Year: No       Family History:   Family History   Problem Relation Age of Onset    High Blood Pressure Mother     Cancer Father         liver cancer    Breast Cancer Maternal Aunt     Ovarian Cancer Neg Hx        REVIEW OF SYSTEMS:    Review of Systems   Constitutional:  Negative for chills and fever. Respiratory:  Negative for shortness of breath and wheezing. Cardiovascular:  Negative for chest pain. Gastrointestinal:  Negative for abdominal pain, nausea and vomiting. Skin:  Negative for color change. Neurological:  Negative for syncope. I have reviewed the patient's information pertinent to this visit, including medical history, family history, social history and reviewof systems. PHYSICAL EXAM:    Vitals:    01/23/23 1029   BP: 118/70   Pulse: 68   SpO2: 100%   Weight: 109 lb (49.4 kg)   Height: 5' (1.524 m)       Physical Exam  Constitutional:       Appearance: She is well-developed. Eyes:      General: No scleral icterus. Conjunctiva/sclera: Conjunctivae normal.   Cardiovascular:      Rate and Rhythm: Normal rate and regular rhythm. Heart sounds: Normal heart sounds. No murmur heard. Pulmonary:      Effort: Pulmonary effort is normal. No respiratory distress. Breath sounds: Normal breath sounds. No wheezing. Chest:   Breasts:     Right: No inverted nipple, mass, nipple discharge, skin change or tenderness. Left: No inverted nipple, mass, nipple discharge, skin change or tenderness. Comments: Well healed scars  Lymphadenopathy:      Upper Body:      Right upper body: No supraclavicular or axillary adenopathy. Left upper body: No supraclavicular or axillary adenopathy.        DATA:    Labs:  Lab Results   Component Value Date    WBC 6.2 03/02/2021    HGB 13.0 03/02/2021    HCT 43.6 03/02/2021     03/02/2021     08/09/2022    K 4.3 08/09/2022     08/09/2022    CO2 25 08/09/2022    BUN 26 (A) 08/09/2022    CREATININE 1.2 (H) 03/01/2021    GLUCOSE 112 (A) 08/09/2022    CALCIUM 9.9 08/09/2022    PROT 7.1 03/01/2021    BILITOT 0.4 03/01/2021    AST 35 03/01/2021    ALT 24 03/01/2021    ALKPHOS 62 03/01/2021 LABA1C 5.8 03/01/2021       Imaging:       Pertinent laboratory and imaging studies were personally reviewed if available. Assessment:  Anastacio Tanner is a 70 y.o. woman who is now  8 years status-post left lumpectomy for DCIS. Plan: Will need a mammogram in 12/2023  Recommend clinical breast exams in 12 month(s)  Those on Aromatase Inhibitors should follow up for monitoring bone health - DEXA due 12/2024  Recommend an active lifestyle, healthy diet, limited alcohol intake, smoking cessation if needed, achieve and maintain a healthy BMI to optimize breast cancer outcomes / decrease risk of breast cancer. Discussed NCCN guidelines   Monthly self-breast exams  Clinical breast exam every 12 months  Annual Mammogram    I reassured her that based on her clinical examination and the imaging studies today there is no evidence of any suspicious findings at this time. I would like to see her back in 1 year. In the interim, I encouraged her to continue monthly self breast examinations and to alert me of any changes. All of the patient's questions were answered at this time however, she was encouraged to call the office with any further inquiries. Approximately 30 minutes of time were spent in preparation, direct patient contact, care coordination, documentation and activities otherwise related to this encounter.      BLANCA Leslie - CNP, APRN-CNP

## 2023-02-13 ENCOUNTER — OFFICE VISIT (OUTPATIENT)
Dept: FAMILY MEDICINE CLINIC | Age: 78
End: 2023-02-13
Payer: MEDICARE

## 2023-02-13 VITALS
BODY MASS INDEX: 21.44 KG/M2 | HEIGHT: 60 IN | WEIGHT: 109.2 LBS | SYSTOLIC BLOOD PRESSURE: 145 MMHG | OXYGEN SATURATION: 99 % | DIASTOLIC BLOOD PRESSURE: 85 MMHG | HEART RATE: 76 BPM

## 2023-02-13 DIAGNOSIS — E03.9 ACQUIRED HYPOTHYROIDISM: ICD-10-CM

## 2023-02-13 DIAGNOSIS — I10 ESSENTIAL HYPERTENSION: Primary | ICD-10-CM

## 2023-02-13 DIAGNOSIS — E87.6 HYPOKALEMIA: ICD-10-CM

## 2023-02-13 DIAGNOSIS — K51.90 ULCERATIVE COLITIS WITHOUT COMPLICATIONS, UNSPECIFIED LOCATION (HCC): ICD-10-CM

## 2023-02-13 DIAGNOSIS — E78.5 HYPERLIPIDEMIA, UNSPECIFIED HYPERLIPIDEMIA TYPE: ICD-10-CM

## 2023-02-13 DIAGNOSIS — F32.5 MAJOR DEPRESSIVE DISORDER WITH SINGLE EPISODE, IN FULL REMISSION (HCC): ICD-10-CM

## 2023-02-13 PROCEDURE — 3079F DIAST BP 80-89 MM HG: CPT | Performed by: FAMILY MEDICINE

## 2023-02-13 PROCEDURE — 3077F SYST BP >= 140 MM HG: CPT | Performed by: FAMILY MEDICINE

## 2023-02-13 PROCEDURE — 99214 OFFICE O/P EST MOD 30 MIN: CPT | Performed by: FAMILY MEDICINE

## 2023-02-13 PROCEDURE — 1123F ACP DISCUSS/DSCN MKR DOCD: CPT | Performed by: FAMILY MEDICINE

## 2023-02-13 RX ORDER — SPIRONOLACTONE 25 MG/1
25 TABLET ORAL DAILY
Qty: 90 TABLET | Refills: 3 | Status: SHIPPED | OUTPATIENT
Start: 2023-02-13

## 2023-02-13 RX ORDER — METOPROLOL SUCCINATE 100 MG/1
100 TABLET, EXTENDED RELEASE ORAL DAILY
Qty: 90 TABLET | Refills: 3 | Status: SHIPPED | OUTPATIENT
Start: 2023-02-13

## 2023-02-13 RX ORDER — VENLAFAXINE HYDROCHLORIDE 37.5 MG/1
37.5 CAPSULE, EXTENDED RELEASE ORAL DAILY
Qty: 90 CAPSULE | Refills: 3 | Status: SHIPPED | OUTPATIENT
Start: 2023-02-13

## 2023-02-13 RX ORDER — POTASSIUM CHLORIDE 20 MEQ/1
TABLET, EXTENDED RELEASE ORAL
Qty: 90 TABLET | Refills: 3 | Status: SHIPPED | OUTPATIENT
Start: 2023-02-13

## 2023-02-13 RX ORDER — LEVOTHYROXINE SODIUM 88 UG/1
75 CAPSULE ORAL DAILY
Qty: 30 CAPSULE | Status: SHIPPED | COMMUNITY
Start: 2023-02-13

## 2023-02-13 RX ORDER — AMLODIPINE BESYLATE 10 MG/1
10 TABLET ORAL DAILY
Qty: 90 TABLET | Refills: 3 | Status: SHIPPED | OUTPATIENT
Start: 2023-02-13

## 2023-02-13 ASSESSMENT — PATIENT HEALTH QUESTIONNAIRE - PHQ9
7. TROUBLE CONCENTRATING ON THINGS, SUCH AS READING THE NEWSPAPER OR WATCHING TELEVISION: 0
SUM OF ALL RESPONSES TO PHQ QUESTIONS 1-9: 1
3. TROUBLE FALLING OR STAYING ASLEEP: 1
SUM OF ALL RESPONSES TO PHQ9 QUESTIONS 1 & 2: 0
6. FEELING BAD ABOUT YOURSELF - OR THAT YOU ARE A FAILURE OR HAVE LET YOURSELF OR YOUR FAMILY DOWN: 0
10. IF YOU CHECKED OFF ANY PROBLEMS, HOW DIFFICULT HAVE THESE PROBLEMS MADE IT FOR YOU TO DO YOUR WORK, TAKE CARE OF THINGS AT HOME, OR GET ALONG WITH OTHER PEOPLE: 0
2. FEELING DOWN, DEPRESSED OR HOPELESS: 0
SUM OF ALL RESPONSES TO PHQ QUESTIONS 1-9: 1
SUM OF ALL RESPONSES TO PHQ QUESTIONS 1-9: 1
4. FEELING TIRED OR HAVING LITTLE ENERGY: 0
SUM OF ALL RESPONSES TO PHQ QUESTIONS 1-9: 1
5. POOR APPETITE OR OVEREATING: 0
9. THOUGHTS THAT YOU WOULD BE BETTER OFF DEAD, OR OF HURTING YOURSELF: 0
8. MOVING OR SPEAKING SO SLOWLY THAT OTHER PEOPLE COULD HAVE NOTICED. OR THE OPPOSITE, BEING SO FIGETY OR RESTLESS THAT YOU HAVE BEEN MOVING AROUND A LOT MORE THAN USUAL: 0
1. LITTLE INTEREST OR PLEASURE IN DOING THINGS: 0

## 2023-02-13 ASSESSMENT — ENCOUNTER SYMPTOMS
SHORTNESS OF BREATH: 0
DIARRHEA: 0
ABDOMINAL PAIN: 0
BACK PAIN: 0
CONSTIPATION: 0
COUGH: 0

## 2023-02-13 NOTE — PROGRESS NOTES
Alesia Chavez  1945 02/13/23    Chief Complaint   Patient presents with    6 Month Follow-Up    Hypertension    Hyperlipidemia    Hypothyroidism    Osteoporosis    Medication Refill           Patient here for 6 months f/u regarding HTN, HLD, hypothyroidism, s/p thyroid cancer, depression, and osteoporosis. The patient is taking hypertensive medications compliantly without side effects. Denies chest pain, dyspnea, edema, or TIA's. She does take her cholesterol medication, she does take osteoporosis medicine too. She dose f/u with Dr Janice Byers for her hypothyroidism. 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 Mayito Retana at [facility]    History of therapeutic radiation     Hx antineoplastic chemo     Hypertension     Ileostomy care ([facility]     Nausea & vomiting     \"got sick with one surgery\"    Thyroid ca [facility])     had thyroid cancer in 2005- tx with radioactive iodine and radiation    Thyroid disease     hypo    Ulcerative colitis ([facility]      Past Surgical History:   Procedure Laterality Date    APPENDECTOMY      BREAST LUMPECTOMY Left 10/13/14    left lumpectomy with needle localization    BREAST SURGERY      2001 lumpectomy right breast    ILEOSTOMY OR JEJUNOSTOMY Right 1966    colon surgery\"removed most of the large and part of small intestine\"'removed appendix at that time    JOINT REPLACEMENT Right     hip    [address]    \"removed a tumor from the left lung\"    RHINOPLASTY      TONSILLECTOMY  as a child     Family History   Problem Relation Age of Onset    High Blood Pressure Mother     Cancer Father         liver cancer    Breast Cancer Maternal Aunt     Ovarian Cancer Neg Hx      Social History     Socioeconomic History    Marital status:       Spouse name: Not on file    Number of children: Not on file    Years of education: Not on file    Highest education level: Not on file   Occupational History    Not on file   Tobacco Use    Smoking status: Never    Smokeless tobacco: Never   Vaping Use    Vaping Use: Never used   Substance and Sexual Activity    Alcohol use: Not Currently     Comment: average 3-4 times per year    Drug use: No    Sexual activity: Not on file   Other Topics Concern    Not on file   Social History Narrative    Not on file     Social Determinants of Health     Financial Resource Strain: Low Risk     Difficulty of Paying Living Expenses: Not very hard   Food Insecurity: No Food Insecurity    Worried About Running Out of Food in the Last Year: Never true    Ran Out of Food in the Last Year: Never true   Transportation Needs: No Transportation Needs    Lack of Transportation (Medical): No    Lack of Transportation (Non-Medical): No   Physical Activity: Insufficiently Active    Days of Exercise per Week: 3 days    Minutes of Exercise per Session: 10 min   Stress: No Stress Concern Present    Feeling of Stress : Only a little   Social Connections: Socially Isolated    Frequency of Communication with Friends and Family: Once a week    Frequency of Social Gatherings with Friends and Family: Once a week    Attends [employer] Services: Never    Active Member of Clubs or Organizations: No    Attends Club or Organization Meetings: Never    Marital Status:     Intimate Partner Violence: Not on file   Housing Stability: Low Risk     Unable to Pay for Housing in the Last Year: No    Number of Places Lived in the Last Year: 1    Unstable Housing in the Last Year: No       Allergies   Allergen Reactions    Codeine Nausea And Vomiting and Rash    Clarithromycin Nausea Only and Nausea And Vomiting    Meperidine      \"room spins\"  \"room spins\"  \"room spins\"    Sulfa Antibiotics Rash and Nausea Only     Current Outpatient Medications   Medication Sig Dispense Refill    amLODIPine (NORVASC) 10 MG tablet Take 1 tablet by mouth daily 90 tablet 3    potassium chloride (KLOR-CON M20) 20 MEQ extended release tablet TAKE 1 TABLET DAILY 90 tablet 3    metoprolol succinate (TOPROL XL) 100 MG extended release tablet Take 1 tablet by mouth daily 90 tablet 3    spironolactone (ALDACTONE) 25 MG tablet Take 1 tablet by mouth daily 90 tablet 3    venlafaxine (EFFEXOR XR) 37.5 MG extended release capsule Take 1 capsule by mouth daily 90 capsule 3    Levothyroxine Sodium 88 MCG CAPS Take 75 mcg by mouth Daily 30 capsule     anastrozole (ARIMIDEX) 1 MG tablet TAKE ONE TABLET BY MOUTH ONCE DAILY 90 tablet 3    atorvastatin (LIPITOR) 10 MG tablet TAKE 1 TABLET DAILY 90 tablet 3    alendronate (FOSAMAX) 70 MG tablet TAKE 1 TABLET EVERY 7 DAYS 12 tablet 3    albuterol sulfate HFA (PROVENTIL;VENTOLIN;PROAIR) 108 (90 Base) MCG/ACT inhaler Inhale 2 puffs into the lungs      calcium-vitamin D (OSCAL-500) 500-200 MG-UNIT per tablet Take 1 tablet by mouth daily       No current facility-administered medications for this visit. Review of Systems   Constitutional:  Negative for activity change, appetite change, chills, fatigue and fever. HENT:  Negative for congestion. Respiratory:  Negative for cough and shortness of breath. Cardiovascular:  Negative for chest pain and leg swelling. Gastrointestinal:  Negative for abdominal pain, constipation and diarrhea. Genitourinary:  Negative for dysuria and frequency. Musculoskeletal:  Negative for back pain. Skin:  Negative for rash. Neurological:  Negative for dizziness, weakness and headaches. Psychiatric/Behavioral:  Negative for agitation and sleep disturbance. The patient is not nervous/anxious.       Lab Results   Component Value Date    WBC 6.2 03/02/2021    HGB 13.0 03/02/2021    HCT 43.6 03/02/2021    MCV 89.2 08/09/2022     03/02/2021     Lab Results   Component Value Date     08/09/2022    K 4.3 08/09/2022     08/09/2022 CO2 25 08/09/2022    BUN 26 (A) 08/09/2022    CREATININE 1.2 (H) 03/01/2021    GLUCOSE 112 (A) 08/09/2022    CALCIUM 9.9 08/09/2022    PROT 7.1 03/01/2021    LABALBU 4.7 03/01/2021    BILITOT 0.4 03/01/2021    ALKPHOS 62 03/01/2021    AST 35 03/01/2021    ALT 24 03/01/2021    LABGLOM 44 08/09/2022    LABGLOM 37 08/09/2022    GFRAA 53 (L) 03/01/2021     Lab Results   Component Value Date    CHOL 209 (H) 02/09/2022    CHOL 256 (H) 03/01/2021     Lab Results   Component Value Date    TRIG 73 02/09/2022    TRIG 93 03/01/2021     Lab Results   Component Value Date    HDL 92 (H) 02/09/2022    HDL 87 03/01/2021     Lab Results   Component Value Date    LDLCALC 102 (H) 02/09/2022     Lab Results   Component Value Date    LABA1C 5.8 03/01/2021     Lab Results   Component Value Date    TSH 1.510 09/07/2021    TSHHS 0.039 (L) 03/01/2021         BP (!) 145/85   Pulse 76   Ht 5' (1.524 m)   Wt 109 lb 3.2 oz (49.5 kg)   SpO2 99%   BMI 21.33 kg/m²     BP Readings from Last 3 Encounters:   02/13/23 (!) 145/85   01/23/23 118/70   11/22/22 132/82       Wt Readings from Last 3 Encounters:   02/13/23 109 lb 3.2 oz (49.5 kg)   01/23/23 109 lb (49.4 kg)   11/22/22 112 lb 6.4 oz (51 kg)         Physical Exam  Constitutional:       General: She is not in acute distress. Appearance: Normal appearance. She is well-developed. She is not ill-appearing or diaphoretic. HENT:      Head: Normocephalic and atraumatic. Eyes:      General: No scleral icterus. Pupils: Pupils are equal, round, and reactive to light. Cardiovascular:      Rate and Rhythm: Normal rate and regular rhythm. Heart sounds: Normal heart sounds. No murmur heard. Pulmonary:      Effort: Pulmonary effort is normal.      Breath sounds: Normal breath sounds. No wheezing. Musculoskeletal:         General: Normal range of motion. Cervical back: Normal range of motion and neck supple. No rigidity. Right lower leg: No edema.       Left lower leg: No edema. Neurological:      General: No focal deficit present. Mental Status: She is alert and oriented to person, place, and time. Psychiatric:         Mood and Affect: Mood normal.         Behavior: Behavior normal.       ASSESSMENT/ PLAN:    1. Essential hypertension  -Blood pressure is stable we will keep the same medication  - amLODIPine (NORVASC) 10 MG tablet; Take 1 tablet by mouth daily  Dispense: 90 tablet; Refill: 3  - metoprolol succinate (TOPROL XL) 100 MG extended release tablet; Take 1 tablet by mouth daily  Dispense: 90 tablet; Refill: 3  - spironolactone (ALDACTONE) 25 MG tablet; Take 1 tablet by mouth daily  Dispense: 90 tablet; Refill: 3  - Comprehensive Metabolic Panel, Fasting; Future  - CBC with Auto Differential; Future  - Vitamin B12 & Folate; Future    2. Hyperlipidemia, unspecified hyperlipidemia type  -She is on Lipitor last LDL was 102 on February 2022  - Lipid Panel; Future    3. Acquired hypothyroidism  -She does follow-up with Dr. Elizabeth Romero    4. Hypokalemia  - potassium chloride (KLOR-CON M20) 20 MEQ extended release tablet; TAKE 1 TABLET DAILY  Dispense: 90 tablet; Refill: 3  - spironolactone (ALDACTONE) 25 MG tablet; Take 1 tablet by mouth daily  Dispense: 90 tablet; Refill: 3    5. Major depressive disorder with single episode, in full remission (HCC)  -Stable  - venlafaxine (EFFEXOR XR) 37.5 MG extended release capsule; Take 1 capsule by mouth daily  Dispense: 90 capsule; Refill: 3    6. Ulcerative colitis without complications, unspecified location ([facility]  -Stable              - All old blood work reviewed with the patient  - Appropriate prescription are addressed. - After visit summery provided. - Questions answered and patient verbalizes understanding.  - Call for any problem, questions, or concerns.  - RTC if symptoms worse. Return in about 6 months (around 8/13/2023).

## 2023-03-15 ENCOUNTER — TELEPHONE (OUTPATIENT)
Dept: FAMILY MEDICINE CLINIC | Age: 78
End: 2023-03-15

## 2023-03-15 NOTE — TELEPHONE ENCOUNTER
Patient called stating she had blood work done at [facility] a few weeks ago and wants to know her results. I tried to return the call to her to let her know that we have not received the results back yet but could not leave a message.

## 2023-03-21 LAB
ALBUMIN/GLOBULIN RATIO: 2.1 RATIO (ref 0.8–2.6)
ALBUMIN: 4.9 G/DL (ref 3.5–5.2)
ALP BLD-CCNC: 70 U/L (ref 23–144)
ALT SERPL-CCNC: 32 U/L (ref 0–60)
AST SERPL-CCNC: 28 U/L (ref 0–55)
BASOPHILS ABSOLUTE: 0.1 K/UL (ref 0–0.3)
BASOPHILS RELATIVE PERCENT: 2 % (ref 0–2)
BILIRUB SERPL-MCNC: 0.4 MG/DL (ref 0–1.2)
BUN BLDV-MCNC: 31 MG/DL (ref 3–29)
BUN/CREAT BLD: 24 (ref 7–25)
CALCIUM SERPL-MCNC: 10.1 MG/DL (ref 8.5–10.5)
CHLORIDE BLD-SCNC: 107 MEQ/L (ref 96–110)
CHOLESTEROL: 217 MG/DL
CO2: 24 MEQ/L (ref 19–32)
CREAT SERPL-MCNC: 1.3 MG/DL (ref 0.5–1.2)
DIFFERENTIAL TYPE: NORMAL
EOSINOPHILS ABSOLUTE: 0.2 K/UL (ref 0–0.5)
EOSINOPHILS RELATIVE PERCENT: 4 % (ref 0–5)
FOLATE: 15.6 NG/ML
GLOBULIN: 2.3 G/DL (ref 1.9–3.6)
GLOMERULAR FILTRATION RATE: 43 MLS/MIN/1.73M2
GLUCOSE BLD-MCNC: 108 MG/DL (ref 70–99)
HCT VFR BLD CALC: 40.1 % (ref 34–49)
HDLC SERPL-MCNC: 92 MG/DL
HEMOGLOBIN: 13 G/DL (ref 11.2–15.7)
LDL CHOLESTEROL CALCULATED: 110 MG/DL
LYMPHOCYTES ABSOLUTE: 0.9 K/UL (ref 0.9–4.1)
LYMPHOCYTES RELATIVE PERCENT: 16 % (ref 14–51)
MCH RBC QN AUTO: 29 PG (ref 26–34)
MCHC RBC AUTO-ENTMCNC: 32.4 G/DL (ref 30.7–35.5)
MCV RBC AUTO: 89.3 FL (ref 80–100)
MONOCYTES ABSOLUTE: 0.3 K/UL (ref 0.2–1)
MONOCYTES RELATIVE PERCENT: 6 % (ref 4–12)
NEUTROPHILS ABSOLUTE: 4.1 K/UL (ref 1.8–7.5)
NEUTROPHILS RELATIVE PERCENT: 72 % (ref 42–80)
PDW BLD-RTO: 13 %
PLATELET # BLD: 282 K/UL (ref 140–400)
PLATELET COMMENT: NORMAL
PMV BLD AUTO: 11.2 FL (ref 7.2–11.7)
POTASSIUM SERPL-SCNC: 5 MEQ/L (ref 3.4–5.3)
RBC # BLD: 4.49 M/UL (ref 3.95–5.26)
RBC # BLD: NORMAL 10*6/UL
SODIUM BLD-SCNC: 144 MEQ/L (ref 135–148)
STATUS: ABNORMAL
TOTAL PROTEIN: 7.2 G/DL (ref 6–8.3)
TRIGL SERPL-MCNC: 75 MG/DL
VITAMIN B-12: 626 PG/ML (ref 200–1100)
VLDLC SERPL CALC-MCNC: 15 MG/DL (ref 4–38)
WBC # BLD: NORMAL 10*3/UL
WBC: 5.6 K/UL (ref 3.5–10.9)

## 2023-04-24 ENCOUNTER — TELEPHONE (OUTPATIENT)
Dept: FAMILY MEDICINE CLINIC | Age: 78
End: 2023-04-24

## 2023-04-24 NOTE — TELEPHONE ENCOUNTER
----- Message from Fercho Diego sent at 4/24/2023 10:39 AM EDT -----  Subject: Appointment Request    Reason for Call: Established Patient Appointment needed: New Patient   Request Appointment    QUESTIONS    Reason for appointment request? No appointments available during search     Additional Information for Provider? pt received a letter in the mail   stating that Dr. Akira Pinto is leaving and she would need to find a new PCP.  I   see no availability   ---------------------------------------------------------------------------  --------------  Zora Bosworth ZJQA  [phone]; OK to leave message on voicemail  ---------------------------------------------------------------------------  --------------  SCRIPT ANSWERS  COVID Screen: Cyril Morrison

## 2023-04-24 NOTE — TELEPHONE ENCOUNTER
pt received a letter in the mail   stating that Dr. Tommy Courtney is leaving and she would need to find a new PCP. I   see no availability     Spoke to patient and let her know that none of our providers are taking new patients at this time. She would need to reach out to find Physician and have them find someone.

## 2023-04-28 DIAGNOSIS — M81.0 OSTEOPOROSIS WITHOUT CURRENT PATHOLOGICAL FRACTURE, UNSPECIFIED OSTEOPOROSIS TYPE: ICD-10-CM

## 2023-04-28 RX ORDER — ALENDRONATE SODIUM 70 MG/1
TABLET ORAL
Qty: 12 TABLET | Refills: 3 | Status: SHIPPED | OUTPATIENT
Start: 2023-04-28

## 2023-12-26 ENCOUNTER — HOSPITAL ENCOUNTER (OUTPATIENT)
Dept: WOMENS IMAGING | Age: 78
Discharge: HOME | End: 2023-12-26
Payer: MEDICARE

## 2023-12-26 VITALS — BODY MASS INDEX: 21.4 KG/M2 | WEIGHT: 109 LBS | HEIGHT: 60 IN

## 2023-12-26 DIAGNOSIS — Z12.31 ENCOUNTER FOR SCREENING MAMMOGRAM FOR MALIGNANT NEOPLASM OF BREAST: ICD-10-CM

## 2023-12-26 PROCEDURE — 77063 BREAST TOMOSYNTHESIS BI: CPT

## 2023-12-31 DIAGNOSIS — R93.89 ABNORMAL FINDING ON IMAGING: Primary | ICD-10-CM

## 2023-12-31 DIAGNOSIS — R92.8 OTHER ABNORMAL AND INCONCLUSIVE FINDINGS ON DIAGNOSTIC IMAGING OF BREAST: ICD-10-CM

## 2024-01-08 ENCOUNTER — HOSPITAL ENCOUNTER (OUTPATIENT)
Dept: ULTRASOUND IMAGING | Age: 79
Discharge: HOME | End: 2024-01-08
Payer: MEDICARE

## 2024-01-08 ENCOUNTER — HOSPITAL ENCOUNTER (OUTPATIENT)
Dept: WOMENS IMAGING | Age: 79
Discharge: HOME | End: 2024-01-08
Payer: MEDICARE

## 2024-01-08 DIAGNOSIS — R93.89 ABNORMAL FINDING ON IMAGING: ICD-10-CM

## 2024-01-08 DIAGNOSIS — R92.8 OTHER ABNORMAL AND INCONCLUSIVE FINDINGS ON DIAGNOSTIC IMAGING OF BREAST: ICD-10-CM

## 2024-01-08 PROCEDURE — 76642 ULTRASOUND BREAST LIMITED: CPT

## 2024-01-08 PROCEDURE — G0279 TOMOSYNTHESIS, MAMMO: HCPCS

## 2024-01-22 ENCOUNTER — OFFICE VISIT (OUTPATIENT)
Dept: SURGERY | Age: 79
End: 2024-01-22
Payer: MEDICARE

## 2024-01-22 VITALS
BODY MASS INDEX: 21.05 KG/M2 | DIASTOLIC BLOOD PRESSURE: 72 MMHG | SYSTOLIC BLOOD PRESSURE: 134 MMHG | HEART RATE: 84 BPM | WEIGHT: 107.2 LBS | OXYGEN SATURATION: 99 % | HEIGHT: 60 IN

## 2024-01-22 DIAGNOSIS — R92.8 ABNORMAL FINDING ON BREAST IMAGING: Primary | ICD-10-CM

## 2024-01-22 DIAGNOSIS — D05.12 DUCTAL CARCINOMA IN SITU (DCIS) OF LEFT BREAST: ICD-10-CM

## 2024-01-22 DIAGNOSIS — C50.911 MALIGNANT NEOPLASM OF RIGHT BREAST IN FEMALE, ESTROGEN RECEPTOR POSITIVE, UNSPECIFIED SITE OF BREAST (HCC): ICD-10-CM

## 2024-01-22 DIAGNOSIS — C50.912 MALIGNANT NEOPLASM OF LEFT BREAST IN FEMALE, ESTROGEN RECEPTOR POSITIVE, UNSPECIFIED SITE OF BREAST (HCC): ICD-10-CM

## 2024-01-22 DIAGNOSIS — Z17.0 MALIGNANT NEOPLASM OF LEFT BREAST IN FEMALE, ESTROGEN RECEPTOR POSITIVE, UNSPECIFIED SITE OF BREAST (HCC): ICD-10-CM

## 2024-01-22 DIAGNOSIS — Z17.0 MALIGNANT NEOPLASM OF RIGHT BREAST IN FEMALE, ESTROGEN RECEPTOR POSITIVE, UNSPECIFIED SITE OF BREAST (HCC): ICD-10-CM

## 2024-01-22 PROCEDURE — 99214 OFFICE O/P EST MOD 30 MIN: CPT | Performed by: NURSE PRACTITIONER

## 2024-01-22 PROCEDURE — 3078F DIAST BP <80 MM HG: CPT | Performed by: NURSE PRACTITIONER

## 2024-01-22 PROCEDURE — 1123F ACP DISCUSS/DSCN MKR DOCD: CPT | Performed by: NURSE PRACTITIONER

## 2024-01-22 PROCEDURE — 3075F SYST BP GE 130 - 139MM HG: CPT | Performed by: NURSE PRACTITIONER

## 2024-01-22 RX ORDER — ANASTROZOLE 1 MG/1
TABLET ORAL
Qty: 90 TABLET | Refills: 3 | Status: SHIPPED | OUTPATIENT
Start: 2024-01-22

## 2024-01-22 ASSESSMENT — ENCOUNTER SYMPTOMS
WHEEZING: 0
COLOR CHANGE: 0
ABDOMINAL PAIN: 0
SHORTNESS OF BREATH: 0
NAUSEA: 0
VOMITING: 0

## 2024-01-22 NOTE — PROGRESS NOTES
GENERAL SURGERY NOTE - Outpatient Progress    PATIENT: Irish Reyes 1945, 79 y.o., female   Date: 1/22/2024    MRN: [id number]   Requesting Provider:   No ref. provider found History Obtained From:  patient, electronic medical record     Reason for Evaluation & Chief Complaint:    Chief Complaint   Patient presents with    Follow-up     1 year F/U Mammo Results       Irish Reyes is a 79 y.o. female who presents today for breast cancer follow up and states that she has been doing well.  She denies palpating any masses in either breast, changes in the appearance of her breasts or the skin and denies bilateral nipple discharge.  She denies weight loss, bone pain, headaches and has no other new systemic complaints. She did have a mammogram requiring US due to asymmetry      left  Breast.  Initial size 2 cm. Nodes resected 0. Numbers positive: 0. Stage: TIS N0 M0. (Stage: IS)   Procedure: lumpectomy  Date: 10/13/2014   Hormonal Therapy:yes, still taking  Chemotherapy: no  Radiation Therapy: done  [facility] no  ONCOTYPE no  ER+/MT+/HER2+    HISTORY OF PRESENT ILLNESS:      Workup Includes:   Mammogram/US: yes  DEXA: yes  Sozo:    Breast History, General  Endocrine Therapy: yes  Oncotype/Genetic Testing: no  Chemotherapy: no  Estrogen/HRT:   Radiation/Environmental Exposure (eg mantle radiation):  Age of menarche: 16  Age at birth of first child: never pregnant  Family History of breast cancer: yes: maternal aunt    Left Breast History  Surgery, Date: Lumpectomy, 10/13/2014  Breast cancer: yes   Type, Stage: DCIS   Size: 2 cm   Receptor Status: ER/MT positive and HER2 positive   Node Status:    Radiation Therapy: yes     Right Breast History  Surgery, Date: lumpectomy in [place] (25 years ago)   Breast cancer:    Type, Stage: DCIS   Size:   Receptor Status:    Node Status:    Radiation Therapy:     Past Medical History:    Past Medical History:   Diagnosis Date    Aspiration pneumonia (HCC)     Breast cancer

## 2024-04-30 LAB
T4 FREE: 1.23 NG/DL (ref 0.8–1.8)
TSH ULTRASENSITIVE: 18.6 MCIU/ML (ref 0.4–4.5)

## 2024-06-21 DIAGNOSIS — Z85.3 HISTORY OF BREAST CANCER: Primary | ICD-10-CM

## 2024-06-21 DIAGNOSIS — R92.8 ABNORMAL FINDINGS ON DIAGNOSTIC IMAGING OF BREAST: Primary | ICD-10-CM

## 2024-12-10 LAB
T4 FREE: 1.49 NG/DL (ref 0.8–1.8)
TSH ULTRASENSITIVE: 49.7 MCIU/ML (ref 0.4–4.5)

## 2025-03-11 LAB
T4 FREE: 1.51 NG/DL (ref 0.8–1.8)
TSH ULTRASENSITIVE: 10.3 MCIU/ML (ref 0.4–4.5)

## 2025-03-17 RX ORDER — ANASTROZOLE 1 MG/1
1 TABLET ORAL DAILY
Qty: 90 TABLET | Refills: 3 | Status: SHIPPED | OUTPATIENT
Start: 2025-03-17

## 2025-04-08 ENCOUNTER — APPOINTMENT (OUTPATIENT)
Dept: GENERAL RADIOLOGY | Age: 80
End: 2025-04-08
Payer: MEDICARE

## 2025-04-08 ENCOUNTER — HOSPITAL ENCOUNTER (EMERGENCY)
Age: 80
Discharge: HOME | End: 2025-04-08
Attending: EMERGENCY MEDICINE
Payer: MEDICARE

## 2025-04-08 VITALS
OXYGEN SATURATION: 93 % | BODY MASS INDEX: 19.36 KG/M2 | SYSTOLIC BLOOD PRESSURE: 205 MMHG | HEIGHT: 60 IN | HEART RATE: 87 BPM | RESPIRATION RATE: 18 BRPM | WEIGHT: 98.6 LBS | TEMPERATURE: 97.7 F | DIASTOLIC BLOOD PRESSURE: 68 MMHG

## 2025-04-08 DIAGNOSIS — S92.355A CLOSED NONDISPLACED FRACTURE OF FIFTH METATARSAL BONE OF LEFT FOOT, INITIAL ENCOUNTER: Primary | ICD-10-CM

## 2025-04-08 PROCEDURE — 73620 X-RAY EXAM OF FOOT: CPT

## 2025-04-08 PROCEDURE — 6370000000 HC RX 637 (ALT 250 FOR IP): Performed by: EMERGENCY MEDICINE

## 2025-04-08 PROCEDURE — 73610 X-RAY EXAM OF ANKLE: CPT

## 2025-04-08 PROCEDURE — 99283 EMERGENCY DEPT VISIT LOW MDM: CPT

## 2025-04-08 RX ORDER — ACETAMINOPHEN 500 MG
1000 TABLET ORAL ONCE
Status: COMPLETED | OUTPATIENT
Start: 2025-04-08 | End: 2025-04-08

## 2025-04-08 RX ORDER — OXYCODONE HYDROCHLORIDE 5 MG/1
5 TABLET ORAL EVERY 6 HOURS PRN
Qty: 5 TABLET | Refills: 0 | Status: SHIPPED | OUTPATIENT
Start: 2025-04-08 | End: 2025-04-11

## 2025-04-08 RX ORDER — OXYCODONE HYDROCHLORIDE 5 MG/1
5 TABLET ORAL ONCE
Refills: 0 | Status: COMPLETED | OUTPATIENT
Start: 2025-04-08 | End: 2025-04-08

## 2025-04-08 RX ADMIN — ACETAMINOPHEN 1000 MG: 500 TABLET ORAL at 19:40

## 2025-04-08 RX ADMIN — OXYCODONE HYDROCHLORIDE 5 MG: 5 TABLET ORAL at 20:25

## 2025-04-08 ASSESSMENT — PAIN - FUNCTIONAL ASSESSMENT
PAIN_FUNCTIONAL_ASSESSMENT: 0-10
PAIN_FUNCTIONAL_ASSESSMENT: PREVENTS OR INTERFERES SOME ACTIVE ACTIVITIES AND ADLS
PAIN_FUNCTIONAL_ASSESSMENT: 0-10

## 2025-04-08 ASSESSMENT — PAIN DESCRIPTION - LOCATION: LOCATION: FOOT

## 2025-04-08 ASSESSMENT — PAIN SCALES - GENERAL
PAINLEVEL_OUTOF10: 6
PAINLEVEL_OUTOF10: 7

## 2025-04-08 ASSESSMENT — PAIN DESCRIPTION - PAIN TYPE
TYPE: ACUTE PAIN
TYPE: ACUTE PAIN

## 2025-04-08 ASSESSMENT — PAIN DESCRIPTION - ONSET
ONSET: PROGRESSIVE
ONSET: SUDDEN

## 2025-04-08 ASSESSMENT — PAIN DESCRIPTION - FREQUENCY
FREQUENCY: CONTINUOUS
FREQUENCY: CONTINUOUS

## 2025-04-08 ASSESSMENT — LIFESTYLE VARIABLES
HOW OFTEN DO YOU HAVE A DRINK CONTAINING ALCOHOL: NEVER
HOW MANY STANDARD DRINKS CONTAINING ALCOHOL DO YOU HAVE ON A TYPICAL DAY: PATIENT DOES NOT DRINK

## 2025-04-08 ASSESSMENT — PAIN DESCRIPTION - ORIENTATION: ORIENTATION: LEFT

## 2025-04-08 NOTE — ED PROVIDER NOTES
CHIEF COMPLAINT    Chief Complaint   Patient presents with    Foot Pain     Pt c/o Lt foot pain after a fall she sustained 2hrs PTA     HPI  Irish Reyes is a 80 y.o. female with history of CKD, basal cell carcinoma, ulcerative colitis, CHF, atrial fibrillation, chronic anticoagulation, hypertension who presents to the ED accompanied by family with complaints of left foot and ankle injury after a fall.  Patient was at dinner with friends when she stood up from dinner table and lost her balance causing 8 twisting type motion of her left foot and ankle in the slow and controlled to fall to the ground which she partially caught herself during.  Did not strike head or lose consciousness.  She is able to stand up after the accident and walk.  However after treating at home she began to notice some pain to the lateral malleolus of left ankle and lateral aspect of left foot after removing her shoe.  The pain is currently described as a 6/10 throbbing and stabbing pain that is constant and exacerbated with walking as well as with palpation.  Nothing makes it better although she did not use any medications prior to arrival for the pain.  Pain does not radiate.  Denies numbness, tingling, nausea, vomiting, headache, neck pain, numbness, tingling      REVIEW OF SYSTEMS  Constitutional: No fever, chills   Eye: No visual changes  HENT: No earache or sore throat.  Resp: No SOB or productive cough.  Cardio: No chest pain or palpitations.  GI: No abdominal pain, nausea, vomiting, constipation or diarrhea. No melena.  : No dysuria, urgency or frequency.  Endocrine: No heat intolerance, no cold intolerance, no polydipsia   Lymphatics: No adenopathy  Musculoskeletal: Complains of injury to left foot and ankle  Neuro: No headaches.  Psych: No homicidal or suicidal thoughts  Skin: No rash, No itching.  ?  ?  PAST MEDICAL HISTORY  Past Medical History:   Diagnosis Date    Aspiration pneumonia (HCC)     Breast cancer     Cancer

## 2025-05-29 ENCOUNTER — TELEPHONE (OUTPATIENT)
Dept: SURGERY | Age: 80
End: 2025-05-29

## 2025-05-29 NOTE — TELEPHONE ENCOUNTER
Pt called clinic because she needs a refill on her anastrozole. Pt's last script was sent in March with 3 refills and now needs an updated order. Please advise.

## 2025-06-02 LAB
T4 FREE: 1.67 NG/DL (ref 0.8–1.8)
TSH ULTRASENSITIVE: 10.1 MCIU/ML (ref 0.4–4.5)